# Patient Record
Sex: FEMALE | Race: WHITE | NOT HISPANIC OR LATINO
[De-identification: names, ages, dates, MRNs, and addresses within clinical notes are randomized per-mention and may not be internally consistent; named-entity substitution may affect disease eponyms.]

---

## 2017-06-12 ENCOUNTER — OTHER (OUTPATIENT)
Age: 82
End: 2017-06-12

## 2017-06-23 LAB
CHOLEST SERPL-MCNC: 137
GLUCOSE SERPL-MCNC: 78
HBA1C MFR BLD HPLC: 5.5
HDLC SERPL-MCNC: 58
LDLC SERPL CALC-MCNC: 62

## 2017-06-26 ENCOUNTER — APPOINTMENT (OUTPATIENT)
Dept: INTERNAL MEDICINE | Facility: CLINIC | Age: 82
End: 2017-06-26

## 2017-06-26 ENCOUNTER — NON-APPOINTMENT (OUTPATIENT)
Age: 82
End: 2017-06-26

## 2017-06-26 VITALS
SYSTOLIC BLOOD PRESSURE: 104 MMHG | BODY MASS INDEX: 21.34 KG/M2 | DIASTOLIC BLOOD PRESSURE: 68 MMHG | TEMPERATURE: 98.3 F | HEART RATE: 66 BPM | HEIGHT: 62 IN | RESPIRATION RATE: 18 BRPM | OXYGEN SATURATION: 96 % | WEIGHT: 115.99 LBS

## 2017-08-11 ENCOUNTER — RX RENEWAL (OUTPATIENT)
Age: 82
End: 2017-08-11

## 2017-08-28 ENCOUNTER — EMERGENCY (EMERGENCY)
Facility: HOSPITAL | Age: 82
LOS: 1 days | Discharge: HOME | End: 2017-08-28
Attending: EMERGENCY MEDICINE
Payer: MEDICARE

## 2017-08-28 VITALS
HEIGHT: 62 IN | SYSTOLIC BLOOD PRESSURE: 154 MMHG | WEIGHT: 115.08 LBS | HEART RATE: 73 BPM | TEMPERATURE: 98 F | DIASTOLIC BLOOD PRESSURE: 74 MMHG | RESPIRATION RATE: 18 BRPM | OXYGEN SATURATION: 97 %

## 2017-08-28 VITALS
RESPIRATION RATE: 18 BRPM | SYSTOLIC BLOOD PRESSURE: 145 MMHG | OXYGEN SATURATION: 98 % | DIASTOLIC BLOOD PRESSURE: 68 MMHG | HEART RATE: 75 BPM

## 2017-08-28 LAB
ALBUMIN SERPL ELPH-MCNC: 4.2 G/DL — SIGNIFICANT CHANGE UP (ref 3.3–5.2)
ALP SERPL-CCNC: 57 U/L — SIGNIFICANT CHANGE UP (ref 40–120)
ALT FLD-CCNC: 16 U/L — SIGNIFICANT CHANGE UP
ANION GAP SERPL CALC-SCNC: 15 MMOL/L — SIGNIFICANT CHANGE UP (ref 5–17)
AST SERPL-CCNC: 24 U/L — SIGNIFICANT CHANGE UP
BASOPHILS # BLD AUTO: 0 K/UL — SIGNIFICANT CHANGE UP (ref 0–0.2)
BASOPHILS NFR BLD AUTO: 0.2 % — SIGNIFICANT CHANGE UP (ref 0–2)
BILIRUB SERPL-MCNC: 0.4 MG/DL — SIGNIFICANT CHANGE UP (ref 0.4–2)
BUN SERPL-MCNC: 20 MG/DL — SIGNIFICANT CHANGE UP (ref 8–20)
CALCIUM SERPL-MCNC: 9.6 MG/DL — SIGNIFICANT CHANGE UP (ref 8.6–10.2)
CHLORIDE SERPL-SCNC: 101 MMOL/L — SIGNIFICANT CHANGE UP (ref 98–107)
CK SERPL-CCNC: 43 U/L — SIGNIFICANT CHANGE UP (ref 25–170)
CO2 SERPL-SCNC: 24 MMOL/L — SIGNIFICANT CHANGE UP (ref 22–29)
CREAT SERPL-MCNC: 0.64 MG/DL — SIGNIFICANT CHANGE UP (ref 0.5–1.3)
EOSINOPHIL # BLD AUTO: 0 K/UL — SIGNIFICANT CHANGE UP (ref 0–0.5)
EOSINOPHIL NFR BLD AUTO: 0.5 % — SIGNIFICANT CHANGE UP (ref 0–6)
GLUCOSE SERPL-MCNC: 88 MG/DL — SIGNIFICANT CHANGE UP (ref 70–115)
HCT VFR BLD CALC: 38.2 % — SIGNIFICANT CHANGE UP (ref 37–47)
HGB BLD-MCNC: 12.9 G/DL — SIGNIFICANT CHANGE UP (ref 12–16)
LIDOCAIN IGE QN: 26 U/L — SIGNIFICANT CHANGE UP (ref 22–51)
LYMPHOCYTES # BLD AUTO: 1.4 K/UL — SIGNIFICANT CHANGE UP (ref 1–4.8)
LYMPHOCYTES # BLD AUTO: 15.9 % — LOW (ref 20–55)
MCHC RBC-ENTMCNC: 31.6 PG — HIGH (ref 27–31)
MCHC RBC-ENTMCNC: 33.8 G/DL — SIGNIFICANT CHANGE UP (ref 32–36)
MCV RBC AUTO: 93.6 FL — SIGNIFICANT CHANGE UP (ref 81–99)
MONOCYTES # BLD AUTO: 0.6 K/UL — SIGNIFICANT CHANGE UP (ref 0–0.8)
MONOCYTES NFR BLD AUTO: 6.6 % — SIGNIFICANT CHANGE UP (ref 3–10)
NEUTROPHILS # BLD AUTO: 6.5 K/UL — SIGNIFICANT CHANGE UP (ref 1.8–8)
NEUTROPHILS NFR BLD AUTO: 76.4 % — HIGH (ref 37–73)
PLATELET # BLD AUTO: 188 K/UL — SIGNIFICANT CHANGE UP (ref 150–400)
POTASSIUM SERPL-MCNC: 4.4 MMOL/L — SIGNIFICANT CHANGE UP (ref 3.5–5.3)
POTASSIUM SERPL-SCNC: 4.4 MMOL/L — SIGNIFICANT CHANGE UP (ref 3.5–5.3)
PROT SERPL-MCNC: 7 G/DL — SIGNIFICANT CHANGE UP (ref 6.6–8.7)
RBC # BLD: 4.08 M/UL — LOW (ref 4.4–5.2)
RBC # FLD: 13.6 % — SIGNIFICANT CHANGE UP (ref 11–15.6)
SODIUM SERPL-SCNC: 140 MMOL/L — SIGNIFICANT CHANGE UP (ref 135–145)
TROPONIN T SERPL-MCNC: <0.01 NG/ML — SIGNIFICANT CHANGE UP (ref 0–0.06)
TROPONIN T SERPL-MCNC: <0.01 NG/ML — SIGNIFICANT CHANGE UP (ref 0–0.06)
WBC # BLD: 8.5 K/UL — SIGNIFICANT CHANGE UP (ref 4.8–10.8)
WBC # FLD AUTO: 8.5 K/UL — SIGNIFICANT CHANGE UP (ref 4.8–10.8)

## 2017-08-28 PROCEDURE — 80053 COMPREHEN METABOLIC PANEL: CPT

## 2017-08-28 PROCEDURE — 82550 ASSAY OF CK (CPK): CPT

## 2017-08-28 PROCEDURE — 85027 COMPLETE CBC AUTOMATED: CPT

## 2017-08-28 PROCEDURE — 99285 EMERGENCY DEPT VISIT HI MDM: CPT

## 2017-08-28 PROCEDURE — 99283 EMERGENCY DEPT VISIT LOW MDM: CPT

## 2017-08-28 PROCEDURE — 84484 ASSAY OF TROPONIN QUANT: CPT

## 2017-08-28 PROCEDURE — 36415 COLL VENOUS BLD VENIPUNCTURE: CPT

## 2017-08-28 PROCEDURE — 93010 ELECTROCARDIOGRAM REPORT: CPT

## 2017-08-28 PROCEDURE — 83690 ASSAY OF LIPASE: CPT

## 2017-08-28 PROCEDURE — 93005 ELECTROCARDIOGRAM TRACING: CPT

## 2017-08-28 RX ORDER — ONDANSETRON 8 MG/1
4 TABLET, FILM COATED ORAL ONCE
Qty: 0 | Refills: 0 | Status: COMPLETED | OUTPATIENT
Start: 2017-08-28 | End: 2017-08-28

## 2017-08-28 NOTE — ED PROVIDER NOTE - OBJECTIVE STATEMENT
This patient is an 85 year old woman with hx of HTN and HLD who presents to the ER c/o nausea and "not feeling well."  She denies chest pain, SOB, fever, abdominal pain.  No episodes of vomiting.

## 2017-08-28 NOTE — ED ADULT NURSE NOTE - OBJECTIVE STATEMENT
85 year old female a&ox3 comes to ED c/o of not feeling right this morning. pt. states she was feeling weak and lightheaded. pt. denies chest pain or sob, pt. states she feels pretty good right now. pt. in no apparent distress at this time.

## 2017-08-28 NOTE — ED ADULT TRIAGE NOTE - CHIEF COMPLAINT QUOTE
[facility] from the [facility] c/o upset stomach for an hour and a half and weakness, Sister from the [facility] c/o upset stomach for an hour and a half and weakness, as per MD Johnston with clinical presentation patient can go to strait to [facility].

## 2017-08-28 NOTE — ED ADULT NURSE REASSESSMENT NOTE - NS ED NURSE REASSESS COMMENT FT1
Pt. resting comfortably on stretcher in no apparent distress.  Denies pain.  Denies weakness, lightheadedness.  Family at bedside.  Awaiting lab order/draw.  POC explained.  In no apparent distress, will continue to monitor.

## 2017-08-28 NOTE — ED PROVIDER NOTE - MEDICAL DECISION MAKING DETAILS
Nausea without chest pain, vomiting and abdominal pain.  Patient feeling unwell.  Will check EKG, Labs, and Re-eval

## 2017-08-28 NOTE — ED ADULT NURSE NOTE - CHIEF COMPLAINT QUOTE
[facility] from the [facility] c/o upset stomach for an hour and a half and weakness, as per MD Johnston with clinical presentation patient can go to strait to [facility].

## 2017-09-19 NOTE — ED PROVIDER NOTE - INTERPRETATION
normal sinus rhythm Coronary artery disease involving native coronary artery of native heart without angina pectoris

## 2017-10-27 ENCOUNTER — INPATIENT (INPATIENT)
Facility: HOSPITAL | Age: 82
LOS: 2 days | Discharge: HOME | DRG: 313 | End: 2017-10-30
Attending: INTERNAL MEDICINE | Admitting: HOSPITALIST
Payer: MEDICARE

## 2017-10-27 VITALS
HEIGHT: 62 IN | DIASTOLIC BLOOD PRESSURE: 71 MMHG | HEART RATE: 76 BPM | SYSTOLIC BLOOD PRESSURE: 160 MMHG | TEMPERATURE: 98 F | OXYGEN SATURATION: 95 % | RESPIRATION RATE: 20 BRPM | WEIGHT: 115.08 LBS

## 2017-10-27 DIAGNOSIS — R07.9 CHEST PAIN, UNSPECIFIED: ICD-10-CM

## 2017-10-27 LAB
ALBUMIN SERPL ELPH-MCNC: 4.5 G/DL — SIGNIFICANT CHANGE UP (ref 3.3–5.2)
ALP SERPL-CCNC: 67 U/L — SIGNIFICANT CHANGE UP (ref 40–120)
ALT FLD-CCNC: 19 U/L — SIGNIFICANT CHANGE UP
ANION GAP SERPL CALC-SCNC: 15 MMOL/L — SIGNIFICANT CHANGE UP (ref 5–17)
AST SERPL-CCNC: 27 U/L — SIGNIFICANT CHANGE UP
BASOPHILS # BLD AUTO: 0 K/UL — SIGNIFICANT CHANGE UP (ref 0–0.2)
BASOPHILS NFR BLD AUTO: 0.2 % — SIGNIFICANT CHANGE UP (ref 0–2)
BILIRUB SERPL-MCNC: 0.4 MG/DL — SIGNIFICANT CHANGE UP (ref 0.4–2)
BUN SERPL-MCNC: 18 MG/DL — SIGNIFICANT CHANGE UP (ref 8–20)
CALCIUM SERPL-MCNC: 10.2 MG/DL — SIGNIFICANT CHANGE UP (ref 8.6–10.2)
CHLORIDE SERPL-SCNC: 100 MMOL/L — SIGNIFICANT CHANGE UP (ref 98–107)
CK SERPL-CCNC: 42 U/L — SIGNIFICANT CHANGE UP (ref 25–170)
CO2 SERPL-SCNC: 24 MMOL/L — SIGNIFICANT CHANGE UP (ref 22–29)
CREAT SERPL-MCNC: 0.64 MG/DL — SIGNIFICANT CHANGE UP (ref 0.5–1.3)
EOSINOPHIL # BLD AUTO: 0.2 K/UL — SIGNIFICANT CHANGE UP (ref 0–0.5)
EOSINOPHIL NFR BLD AUTO: 1.8 % — SIGNIFICANT CHANGE UP (ref 0–6)
GLUCOSE SERPL-MCNC: 83 MG/DL — SIGNIFICANT CHANGE UP (ref 70–115)
HCT VFR BLD CALC: 39 % — SIGNIFICANT CHANGE UP (ref 37–47)
HGB BLD-MCNC: 13.2 G/DL — SIGNIFICANT CHANGE UP (ref 12–16)
LIDOCAIN IGE QN: 30 U/L — SIGNIFICANT CHANGE UP (ref 22–51)
LYMPHOCYTES # BLD AUTO: 1.7 K/UL — SIGNIFICANT CHANGE UP (ref 1–4.8)
LYMPHOCYTES # BLD AUTO: 19 % — LOW (ref 20–55)
MCHC RBC-ENTMCNC: 31.4 PG — HIGH (ref 27–31)
MCHC RBC-ENTMCNC: 33.8 G/DL — SIGNIFICANT CHANGE UP (ref 32–36)
MCV RBC AUTO: 92.6 FL — SIGNIFICANT CHANGE UP (ref 81–99)
MONOCYTES # BLD AUTO: 0.8 K/UL — SIGNIFICANT CHANGE UP (ref 0–0.8)
MONOCYTES NFR BLD AUTO: 8.7 % — SIGNIFICANT CHANGE UP (ref 3–10)
NEUTROPHILS # BLD AUTO: 6.3 K/UL — SIGNIFICANT CHANGE UP (ref 1.8–8)
NEUTROPHILS NFR BLD AUTO: 69.9 % — SIGNIFICANT CHANGE UP (ref 37–73)
PLATELET # BLD AUTO: 231 K/UL — SIGNIFICANT CHANGE UP (ref 150–400)
POTASSIUM SERPL-MCNC: 4.5 MMOL/L — SIGNIFICANT CHANGE UP (ref 3.5–5.3)
POTASSIUM SERPL-SCNC: 4.5 MMOL/L — SIGNIFICANT CHANGE UP (ref 3.5–5.3)
PROT SERPL-MCNC: 7.7 G/DL — SIGNIFICANT CHANGE UP (ref 6.6–8.7)
RBC # BLD: 4.21 M/UL — LOW (ref 4.4–5.2)
RBC # FLD: 13.7 % — SIGNIFICANT CHANGE UP (ref 11–15.6)
SODIUM SERPL-SCNC: 139 MMOL/L — SIGNIFICANT CHANGE UP (ref 135–145)
TROPONIN T SERPL-MCNC: <0.01 NG/ML — SIGNIFICANT CHANGE UP (ref 0–0.06)
WBC # BLD: 9 K/UL — SIGNIFICANT CHANGE UP (ref 4.8–10.8)
WBC # FLD AUTO: 9 K/UL — SIGNIFICANT CHANGE UP (ref 4.8–10.8)

## 2017-10-27 PROCEDURE — 93010 ELECTROCARDIOGRAM REPORT: CPT

## 2017-10-27 PROCEDURE — 99285 EMERGENCY DEPT VISIT HI MDM: CPT | Mod: GC

## 2017-10-27 PROCEDURE — 71110 X-RAY EXAM RIBS BIL 3 VIEWS: CPT | Mod: 26

## 2017-10-27 PROCEDURE — 71010: CPT | Mod: 26,59

## 2017-10-27 PROCEDURE — 99223 1ST HOSP IP/OBS HIGH 75: CPT

## 2017-10-27 RX ORDER — ENOXAPARIN SODIUM 100 MG/ML
40 INJECTION SUBCUTANEOUS DAILY
Qty: 0 | Refills: 0 | Status: DISCONTINUED | OUTPATIENT
Start: 2017-10-27 | End: 2017-10-30

## 2017-10-27 RX ORDER — LEVOTHYROXINE SODIUM 125 MCG
50 TABLET ORAL DAILY
Qty: 0 | Refills: 0 | Status: DISCONTINUED | OUTPATIENT
Start: 2017-10-27 | End: 2017-10-30

## 2017-10-27 RX ORDER — SODIUM CHLORIDE 9 MG/ML
3 INJECTION INTRAMUSCULAR; INTRAVENOUS; SUBCUTANEOUS ONCE
Qty: 0 | Refills: 0 | Status: COMPLETED | OUTPATIENT
Start: 2017-10-27 | End: 2017-10-27

## 2017-10-27 RX ORDER — SIMVASTATIN 20 MG/1
20 TABLET, FILM COATED ORAL AT BEDTIME
Qty: 0 | Refills: 0 | Status: DISCONTINUED | OUTPATIENT
Start: 2017-10-27 | End: 2017-10-30

## 2017-10-27 RX ORDER — ASPIRIN/CALCIUM CARB/MAGNESIUM 324 MG
81 TABLET ORAL DAILY
Qty: 0 | Refills: 0 | Status: DISCONTINUED | OUTPATIENT
Start: 2017-10-28 | End: 2017-10-30

## 2017-10-27 RX ORDER — ASPIRIN/CALCIUM CARB/MAGNESIUM 324 MG
325 TABLET ORAL ONCE
Qty: 0 | Refills: 0 | Status: COMPLETED | OUTPATIENT
Start: 2017-10-27 | End: 2017-10-27

## 2017-10-27 RX ORDER — PANTOPRAZOLE SODIUM 20 MG/1
40 TABLET, DELAYED RELEASE ORAL
Qty: 0 | Refills: 0 | Status: DISCONTINUED | OUTPATIENT
Start: 2017-10-27 | End: 2017-10-30

## 2017-10-27 RX ORDER — ALPRAZOLAM 0.25 MG
0.25 TABLET ORAL ONCE
Qty: 0 | Refills: 0 | Status: DISCONTINUED | OUTPATIENT
Start: 2017-10-27 | End: 2017-10-27

## 2017-10-27 RX ORDER — LOSARTAN POTASSIUM 100 MG/1
50 TABLET, FILM COATED ORAL DAILY
Qty: 0 | Refills: 0 | Status: DISCONTINUED | OUTPATIENT
Start: 2017-10-27 | End: 2017-10-30

## 2017-10-27 RX ORDER — NITROGLYCERIN 6.5 MG
1 CAPSULE, EXTENDED RELEASE ORAL ONCE
Qty: 0 | Refills: 0 | Status: COMPLETED | OUTPATIENT
Start: 2017-10-27 | End: 2017-10-27

## 2017-10-27 RX ADMIN — Medication 0.25 MILLIGRAM(S): at 22:58

## 2017-10-27 RX ADMIN — SODIUM CHLORIDE 3 MILLILITER(S): 9 INJECTION INTRAMUSCULAR; INTRAVENOUS; SUBCUTANEOUS at 22:45

## 2017-10-27 RX ADMIN — SODIUM CHLORIDE 3 MILLILITER(S): 9 INJECTION INTRAMUSCULAR; INTRAVENOUS; SUBCUTANEOUS at 19:13

## 2017-10-27 RX ADMIN — Medication 325 MILLIGRAM(S): at 19:13

## 2017-10-27 NOTE — ED PROVIDER NOTE - ATTENDING CONTRIBUTION TO CARE
I, Harlan Stringer, performed the initial face to face bedside interview with this patient regarding history of present illness, review of symptoms and relevant past medical, social and family history.  I completed an independent physical examination.  I was the initial provider who evaluated this patient. I have signed out the follow up of any pending tests (i.e. labs, radiological studies) to the resident.  I have communicated the patient’s plan of care and disposition with the resident.

## 2017-10-27 NOTE — H&P ADULT - NSHPLABSRESULTS_GEN_ALL_CORE
LABS:                        13.2   9.0   )-----------( 231      ( 27 Oct 2017 19:53 )             39.0     10-27    139  |  100  |  18.0  ----------------------------<  83  4.5   |  24.0  |  0.64    Ca    10.2      27 Oct 2017 19:53    TPro  7.7  /  Alb  4.5  /  TBili  0.4  /  DBili  x   /  AST  27  /  ALT  19  /  AlkPhos  67  10-27      CARDIAC MARKERS ( 27 Oct 2017 19:53 )  x     / <0.01 ng/mL / 42 U/L / x     / x        EKG: NSR at bpm. Normal axis. No acute ST changes.

## 2017-10-27 NOTE — H&P ADULT - ASSESSMENT
86 years old female with PMH of HTN, HLD, Hypothyroidism and Seizure Disorder (s/p craniotomy) comes with epigastric discomfort. As pe patient, she woke up this morning - had breakfast and then went for food shopping. Around 10 am, she started having epigastric / retrosternal discomfort - describes as heaviness associated with nausea. Denies vomiting, palpitations, shortness of breath, headache, dizziness or fever. Denies eating anything different.    She has this discomfort in past and it resolves on it's own. She was seen here in August and was discharged after being evaluated in ER.     1) R/O ACS  - Cardiac Enzymes, Lipid Profile and HbA1c  - ECHO  - Continue Aspirin and Simvastatin  - Will consider Cardiology Consult after ECHO  2) HTN  - Continue Losartan  3) HLD  - Lipid Profile  - Continue Simvastatin   4) Hypothyroidism  - Continue Levothyroxine  DVT Prophylaxis -- Lovenox 40 mg 86 years old female with PMH of HTN, HLD, Hypothyroidism and Seizure Disorder (s/p craniotomy) comes with epigastric discomfort. As per patient, she woke up this morning - had breakfast and then went for food shopping. Around 10 am, she started having epigastric / retrosternal discomfort - describes as heaviness associated with nausea. Denies vomiting, palpitations, shortness of breath, headache, dizziness or fever. Denies eating anything different.    She had this discomfort in past and it resolves on it's own. She was seen here in August with same complain and was discharged after being evaluated in ER.     1) R/O ACS  - Cardiac Enzymes, Lipid Profile and HbA1c  - ECHO  - Continue Aspirin and Simvastatin  - Will consider Cardiology Consult after ECHO  2) HTN  - Continue Losartan  3) HLD  - Lipid Profile  - Continue Simvastatin   4) Hypothyroidism  - Continue Levothyroxine  DVT Prophylaxis -- Lovenox 40 mg

## 2017-10-27 NOTE — ED PROVIDER NOTE - CARE PLAN
Principal Discharge DX:	Chest pain with high risk of acute coronary syndrome  Secondary Diagnosis:	HLD (hyperlipidemia)  Secondary Diagnosis:	HTN (hypertension)

## 2017-10-27 NOTE — ED ADULT NURSE REASSESSMENT NOTE - NS ED NURSE REASSESS COMMENT FT1
Pt is Aox3 in NAD. Pt denies complaint.  IV clean dry and intact, Pt OOB independently. Safety maintained, Bed locked in lowest position. Call bell in reach. Pt awaiting bed placement.

## 2017-10-27 NOTE — H&P ADULT - HISTORY OF PRESENT ILLNESS
86 years old female with PMH of HTN, HLD, Hypothyroidism and Seizure Disorder (s/p craniotomy) comes with epigastric discomfort. As pe patient, she woke up this morning - had breakfast and then went for food shopping. Around 10 am, she started having epigastric / retrosternal discomfort - describes as heaviness associated with nausea. Denies vomiting, palpitations, shortness of breath, headache, dizziness or fever. Denies eating anything different.    She has this discomfort in past and it resolves on it's own. She was seen here in August and was discharged after being evaluated in ER. 86 years old female with PMH of HTN, HLD, Hypothyroidism and Seizure Disorder (s/p craniotomy) comes with epigastric discomfort. As per patient, she woke up this morning - had breakfast and then went for food shopping. Around 10 am, she started having epigastric / retrosternal discomfort - describes as heaviness associated with nausea. Denies vomiting, palpitations, shortness of breath, headache, dizziness or fever. Denies eating anything different.    She had this discomfort in past and it resolves on it's own. She was seen here in August with same complain and was discharged after being evaluated in ER.

## 2017-10-27 NOTE — H&P ADULT - FAMILY HISTORY
Father  Still living? Unknown  Family history of heart disease, Age at diagnosis: Age Unknown     Sibling  Still living? Unknown  Family history of heart disease, Age at diagnosis: Age Unknown

## 2017-10-27 NOTE — H&P ADULT - PSH
H/O craniotomy  due to seizures. Had resection of one of the cranial nerve (? 8th) on right side as per patient.  S/P appendectomy

## 2017-10-27 NOTE — ED PROVIDER NOTE - OBJECTIVE STATEMENT
85 yo female pmh HTN, HLD, hypothyroid, presents with epigastric "pressure" beginning at 10 am, with associated nausea. Pressure is non-radiating, without provoking factors, and has decreased since this morning. No vomiting, diarrhea, constipation, chest pain, SOB, headache, or lightheadedness.

## 2017-10-27 NOTE — H&P ADULT - NSHPPHYSICALEXAM_GEN_ALL_CORE
Vital Signs   T(C): 36.9 (27 Oct 2017 22:30), Max: 36.9 (27 Oct 2017 22:30)  T(F): 98.4 (27 Oct 2017 22:30), Max: 98.4 (27 Oct 2017 22:30)  HR: 74 (27 Oct 2017 22:30) (74 - 76)  BP: 144/65 (27 Oct 2017 22:30) (144/65 - 160/71)  RR: 18 (27 Oct 2017 22:30) (18 - 20)  SpO2: 96% (27 Oct 2017 22:30) (95% - 96%)  General: Well developed. Well nourished. No acute distress  HEENT: PERRLA. EOMI. Clear conjunctivae. Moist mucus membrane  Neck: Supple. No JVD. No Thyromegaly   Chest: CTA bilaterally - no wheezing, rales or rhonchi. No chest wall tenderness.  Heart: Normal S1 & S2 with RRR. No murmur.   Abdomen: Soft. Non-tender. Non-distended. + BS  Ext: No pedal edema. No calf tenderness   Neuro: AAO x 3, No focal deficit, CN II-XII grossly WNL and no speech disorder  Skin: Warm and Dry  Psychiatry: Normal mood and affect

## 2017-10-27 NOTE — ED ADULT NURSE NOTE - OBJECTIVE STATEMENT
Pt reports epigastric/lower chest discomfort with nausea in the am shortly after eating oatmeal, orange juice and coffee.

## 2017-10-28 DIAGNOSIS — Z90.49 ACQUIRED ABSENCE OF OTHER SPECIFIED PARTS OF DIGESTIVE TRACT: Chronic | ICD-10-CM

## 2017-10-28 DIAGNOSIS — Z98.890 OTHER SPECIFIED POSTPROCEDURAL STATES: Chronic | ICD-10-CM

## 2017-10-28 LAB
ANION GAP SERPL CALC-SCNC: 12 MMOL/L — SIGNIFICANT CHANGE UP (ref 5–17)
BASOPHILS # BLD AUTO: 0 K/UL — SIGNIFICANT CHANGE UP (ref 0–0.2)
BASOPHILS NFR BLD AUTO: 0.4 % — SIGNIFICANT CHANGE UP (ref 0–2)
BUN SERPL-MCNC: 16 MG/DL — SIGNIFICANT CHANGE UP (ref 8–20)
CALCIUM SERPL-MCNC: 9.4 MG/DL — SIGNIFICANT CHANGE UP (ref 8.6–10.2)
CHLORIDE SERPL-SCNC: 100 MMOL/L — SIGNIFICANT CHANGE UP (ref 98–107)
CHOLEST SERPL-MCNC: 134 MG/DL — SIGNIFICANT CHANGE UP (ref 110–199)
CK SERPL-CCNC: 31 U/L — SIGNIFICANT CHANGE UP (ref 25–170)
CO2 SERPL-SCNC: 27 MMOL/L — SIGNIFICANT CHANGE UP (ref 22–29)
CREAT SERPL-MCNC: 0.62 MG/DL — SIGNIFICANT CHANGE UP (ref 0.5–1.3)
EOSINOPHIL # BLD AUTO: 0.2 K/UL — SIGNIFICANT CHANGE UP (ref 0–0.5)
EOSINOPHIL NFR BLD AUTO: 3.4 % — SIGNIFICANT CHANGE UP (ref 0–6)
GLUCOSE SERPL-MCNC: 75 MG/DL — SIGNIFICANT CHANGE UP (ref 70–115)
HBA1C BLD-MCNC: 5.4 % — SIGNIFICANT CHANGE UP (ref 4–5.6)
HCT VFR BLD CALC: 36.9 % — LOW (ref 37–47)
HDLC SERPL-MCNC: 65 MG/DL — SIGNIFICANT CHANGE UP
HGB BLD-MCNC: 12.5 G/DL — SIGNIFICANT CHANGE UP (ref 12–16)
LIPID PNL WITH DIRECT LDL SERPL: 52 MG/DL — SIGNIFICANT CHANGE UP
LYMPHOCYTES # BLD AUTO: 2.4 K/UL — SIGNIFICANT CHANGE UP (ref 1–4.8)
LYMPHOCYTES # BLD AUTO: 34.4 % — SIGNIFICANT CHANGE UP (ref 20–55)
MAGNESIUM SERPL-MCNC: 2 MG/DL — SIGNIFICANT CHANGE UP (ref 1.8–2.6)
MCHC RBC-ENTMCNC: 31.8 PG — HIGH (ref 27–31)
MCHC RBC-ENTMCNC: 33.9 G/DL — SIGNIFICANT CHANGE UP (ref 32–36)
MCV RBC AUTO: 93.9 FL — SIGNIFICANT CHANGE UP (ref 81–99)
MONOCYTES # BLD AUTO: 0.8 K/UL — SIGNIFICANT CHANGE UP (ref 0–0.8)
MONOCYTES NFR BLD AUTO: 11.2 % — HIGH (ref 3–10)
NEUTROPHILS # BLD AUTO: 3.4 K/UL — SIGNIFICANT CHANGE UP (ref 1.8–8)
NEUTROPHILS NFR BLD AUTO: 50.3 % — SIGNIFICANT CHANGE UP (ref 37–73)
PHOSPHATE SERPL-MCNC: 3.5 MG/DL — SIGNIFICANT CHANGE UP (ref 2.4–4.7)
PLATELET # BLD AUTO: 209 K/UL — SIGNIFICANT CHANGE UP (ref 150–400)
POTASSIUM SERPL-MCNC: 4.1 MMOL/L — SIGNIFICANT CHANGE UP (ref 3.5–5.3)
POTASSIUM SERPL-SCNC: 4.1 MMOL/L — SIGNIFICANT CHANGE UP (ref 3.5–5.3)
RBC # BLD: 3.93 M/UL — LOW (ref 4.4–5.2)
RBC # FLD: 13.7 % — SIGNIFICANT CHANGE UP (ref 11–15.6)
SODIUM SERPL-SCNC: 139 MMOL/L — SIGNIFICANT CHANGE UP (ref 135–145)
TOTAL CHOLESTEROL/HDL RATIO MEASUREMENT: 2 RATIO — LOW (ref 3.3–7.1)
TRIGL SERPL-MCNC: 85 MG/DL — SIGNIFICANT CHANGE UP (ref 10–200)
TROPONIN T SERPL-MCNC: <0.01 NG/ML — SIGNIFICANT CHANGE UP (ref 0–0.06)
WBC # BLD: 6.9 K/UL — SIGNIFICANT CHANGE UP (ref 4.8–10.8)
WBC # FLD AUTO: 6.9 K/UL — SIGNIFICANT CHANGE UP (ref 4.8–10.8)

## 2017-10-28 PROCEDURE — 99223 1ST HOSP IP/OBS HIGH 75: CPT

## 2017-10-28 PROCEDURE — 99233 SBSQ HOSP IP/OBS HIGH 50: CPT

## 2017-10-28 RX ORDER — UBIDECARENONE 100 MG
1 CAPSULE ORAL
Qty: 0 | Refills: 0 | COMMUNITY

## 2017-10-28 RX ADMIN — PANTOPRAZOLE SODIUM 40 MILLIGRAM(S): 20 TABLET, DELAYED RELEASE ORAL at 05:26

## 2017-10-28 RX ADMIN — SIMVASTATIN 20 MILLIGRAM(S): 20 TABLET, FILM COATED ORAL at 21:12

## 2017-10-28 RX ADMIN — Medication 50 MICROGRAM(S): at 05:26

## 2017-10-28 NOTE — PROGRESS NOTE ADULT - SUBJECTIVE AND OBJECTIVE BOX
MABEL EPILONE  ----------------------------------------  The patient was seen and evaluated for chest pain.  The patient is in no acute distress.  Denied any chest pain, palpitations, dyspnea, or abdominal pain.    Vital Signs Last 24 Hrs  T(C): 36.9 (28 Oct 2017 10:44), Max: 36.9 (27 Oct 2017 22:30)  T(F): 98.5 (28 Oct 2017 10:44), Max: 98.5 (28 Oct 2017 10:44)  HR: 56 (28 Oct 2017 10:44) (54 - 76)  BP: 98/57 (28 Oct 2017 10:44) (98/57 - 160/71)  BP(mean): --  RR: 20 (28 Oct 2017 10:44) (18 - 20)  SpO2: 95% (28 Oct 2017 10:44) (95% - 97%)    PHYSICAL EXAMINATION:  ----------------------------------------  General appearance: NAD, Awake, Alert  HEENT: NCAT, Conjunctiva clear, EOMI  Neck: Supple, No JVD, No tenderness  Lungs: Clear to auscultation, Breath sound equal bilaterally, No wheezes, No rales  Cardiovascular: S1S2, Regular rhythm  Abdomen: Soft, Nontender, Nondistended, No guarding/rebound, Positive bowel sounds  Extremities: No clubbing, No cyanosis, No edema, No calf tenderness  Neuro: Strength equal bilaterally, No tremors  Psychiatric: Appropriate mood, Normal affect    LABORATORY STUDIES:  ----------------------------------------             12.5   6.9   )-----------( 209      ( 28 Oct 2017 06:38 )             36.9     10-28    139  |  100  |  16.0  ----------------------------<  75  4.1   |  27.0  |  0.62    Ca    9.4      28 Oct 2017 06:38  Phos  3.5     10-28  Mg     2.0     10-28    TPro  7.7  /  Alb  4.5  /  TBili  0.4  /  DBili  x   /  AST  27  /  ALT  19  /  AlkPhos  67  10-27    LIVER FUNCTIONS - ( 27 Oct 2017 19:53 )  Alb: 4.5 g/dL / Pro: 7.7 g/dL / ALK PHOS: 67 U/L / ALT: 19 U/L / AST: 27 U/L / GGT: x           CARDIAC MARKERS ( 28 Oct 2017 06:38 )  x     / <0.01 ng/mL / 31 U/L / x     / x      CARDIAC MARKERS ( 27 Oct 2017 19:53 )  x     / <0.01 ng/mL / 42 U/L / x     / x        MEDICATIONS  (STANDING):  aspirin  chewable 81 milliGRAM(s) Oral daily  calcium carbonate 1250 mG + Vitamin D (OsCal 500 + D) 1 Tablet(s) Oral daily  enoxaparin Injectable 40 milliGRAM(s) SubCutaneous daily  levothyroxine 50 MICROGram(s) Oral daily  losartan 50 milliGRAM(s) Oral daily  pantoprazole    Tablet 40 milliGRAM(s) Oral before breakfast  simvastatin 20 milliGRAM(s) Oral at bedtime      ASSESSMENT / PLAN:  ----------------------------------------  Chest pain - On aspirin and statin therapy. Cardiology consultation noted, planned for stress test.    Hypertension - Close blood pressure monitoring.    Hypothyroidism - On levothyroxine.

## 2017-10-28 NOTE — CONSULT NOTE ADULT - ASSESSMENT
86 years old female with PMH of HTN, HLD, Hypothyroidism and Seizure Disorder presenting with upper epigastric/lower chest pressure lasting several hours, similar to prior episodes. No evidence of active cardiac pathology, but given risk factors and symptoms warrants workup for ischemia.   -Monitor BP, would adjust antihypertensive as needed  -TTE  -nuclear stress on monday  -telemetry

## 2017-10-28 NOTE — CONSULT NOTE ADULT - SUBJECTIVE AND OBJECTIVE BOX
HPI:  86 years old female with PMH of HTN, HLD, Hypothyroidism and Seizure Disorder (s/p craniotomy) presenting with upper epigastric discomfort which began yesterday am. She describes pressure sensation in upper mid epigastrum/lower chest without significant radiation. She notes it began when shopping and lasted several hours, but no clear exacerbation with activity or position. She had associated nausea, but denies SOB, fevers, chills, palpitation, syncope. She has had similar discomfort in the past, with no specific workup.   Of note she reports prior episode of transient amnesia and subsequently had cardiology workup in [place], possibly including stress evaluation but no records available.   In ED ECG without ischemic changes and initial cardiac enzymes unremarkable. She feels better at this time, with no complaints at rest.       PAST MEDICAL & SURGICAL HISTORY:  Hypothyroid  HLD (hyperlipidemia)  HTN (hypertension)  S/P appendectomy  H/O craniotomy: due to seizures. Had resection of one of the cranial nerve (? 8th) on right side as per patient.    MEDICATIONS  (STANDING):  aspirin  chewable 81 milliGRAM(s) Oral daily  calcium carbonate 1250 mG + Vitamin D (OsCal 500 + D) 1 Tablet(s) Oral daily  enoxaparin Injectable 40 milliGRAM(s) SubCutaneous daily  levothyroxine 50 MICROGram(s) Oral daily  losartan 50 milliGRAM(s) Oral daily  pantoprazole    Tablet 40 milliGRAM(s) Oral before breakfast  simvastatin 20 milliGRAM(s) Oral at bedtime    MEDICATIONS  (PRN):      Allergies    sulfa drugs (Rash)    Intolerances        SOCIAL HISTORY: denies tob, etoh, drugs    FAMILY HISTORY:  Family history of heart disease (Father, Sibling)      Vital Signs Last 24 Hrs  T(C): 36.7 (28 Oct 2017 07:37), Max: 36.9 (27 Oct 2017 22:30)  T(F): 98 (28 Oct 2017 07:37), Max: 98.4 (27 Oct 2017 22:30)  HR: 60 (28 Oct 2017 07:37) (54 - 76)  BP: 156/65 (28 Oct 2017 07:37) (101/56 - 160/71)  BP(mean): --  RR: 20 (28 Oct 2017 07:37) (18 - 20)  SpO2: 97% (28 Oct 2017 07:37) (95% - 97%)    PHYSICAL EXAM:  NAD  NC/AT  no JVD  CTA b/l  RRR no mrg  s/nt/nd, nml BS  no LE edema, warm  AAOx3, nonfocal  no apparent rash      LABS:                        12.5   6.9   )-----------( 209      ( 28 Oct 2017 06:38 )             36.9   10-28    139  |  100  |  16.0  ----------------------------<  75  4.1   |  27.0  |  0.62    Ca    9.4      28 Oct 2017 06:38  Phos  3.5     10-28  Mg     2.0     10-28    TPro  7.7  /  Alb  4.5  /  TBili  0.4  /  DBili  x   /  AST  27  /  ALT  19  /  AlkPhos  67  10-27  LIVER FUNCTIONS - ( 27 Oct 2017 19:53 )  Alb: 4.5 g/dL / Pro: 7.7 g/dL / ALK PHOS: 67 U/L / ALT: 19 U/L / AST: 27 U/L / GGT: x           CARDIAC MARKERS ( 28 Oct 2017 06:38 )  x     / <0.01 ng/mL / 31 U/L / x     / x      CARDIAC MARKERS ( 27 Oct 2017 19:53 )  x     / <0.01 ng/mL / 42 U/L / x     / x        RADIOLOGY & ADDITIONAL STUDIES:  ECG sinus rhythm 76 bpm, no ischemic changes, narrow QRS HPI:  86 years old female with PMH of HTN, HLD, Hypothyroidism and Seizure Disorder (s/p craniotomy) presenting with upper epigastric discomfort which began yesterday am. She describes pressure sensation in upper mid epigastrum/lower chest without significant radiation. She notes it began when shopping and lasted several hours, but no clear exacerbation with activity or position. She had associated nausea, but denies SOB, fevers, chills, palpitation, syncope. She has had similar discomfort in the past, with no specific workup.   Of note she reports prior episode of transient amnesia and subsequently had cardiology workup in [place], possibly including stress evaluation but no records available. Also recent right rib fracture.  In ED ECG without ischemic changes and initial cardiac enzymes unremarkable. She feels better at this time, with no complaints at rest.       PAST MEDICAL & SURGICAL HISTORY:  Hypothyroid  HLD (hyperlipidemia)  HTN (hypertension)  S/P appendectomy  H/O craniotomy: due to seizures. Had resection of one of the cranial nerve (? 8th) on right side as per patient.    MEDICATIONS  (STANDING):  aspirin  chewable 81 milliGRAM(s) Oral daily  calcium carbonate 1250 mG + Vitamin D (OsCal 500 + D) 1 Tablet(s) Oral daily  enoxaparin Injectable 40 milliGRAM(s) SubCutaneous daily  levothyroxine 50 MICROGram(s) Oral daily  losartan 50 milliGRAM(s) Oral daily  pantoprazole    Tablet 40 milliGRAM(s) Oral before breakfast  simvastatin 20 milliGRAM(s) Oral at bedtime    MEDICATIONS  (PRN):      Allergies    sulfa drugs (Rash)    Intolerances        SOCIAL HISTORY: denies tob, etoh, drugs    FAMILY HISTORY:  Family history of heart disease (Father, Sibling)      Vital Signs Last 24 Hrs  T(C): 36.7 (28 Oct 2017 07:37), Max: 36.9 (27 Oct 2017 22:30)  T(F): 98 (28 Oct 2017 07:37), Max: 98.4 (27 Oct 2017 22:30)  HR: 60 (28 Oct 2017 07:37) (54 - 76)  BP: 156/65 (28 Oct 2017 07:37) (101/56 - 160/71)  BP(mean): --  RR: 20 (28 Oct 2017 07:37) (18 - 20)  SpO2: 97% (28 Oct 2017 07:37) (95% - 97%)    PHYSICAL EXAM:  NAD  NC/AT  no JVD  CTA b/l  RRR no mrg  s/nt/nd, nml BS  no LE edema, warm  AAOx3, nonfocal  no apparent rash      LABS:                        12.5   6.9   )-----------( 209      ( 28 Oct 2017 06:38 )             36.9   10-28    139  |  100  |  16.0  ----------------------------<  75  4.1   |  27.0  |  0.62    Ca    9.4      28 Oct 2017 06:38  Phos  3.5     10-28  Mg     2.0     10-28    TPro  7.7  /  Alb  4.5  /  TBili  0.4  /  DBili  x   /  AST  27  /  ALT  19  /  AlkPhos  67  10-27  LIVER FUNCTIONS - ( 27 Oct 2017 19:53 )  Alb: 4.5 g/dL / Pro: 7.7 g/dL / ALK PHOS: 67 U/L / ALT: 19 U/L / AST: 27 U/L / GGT: x           CARDIAC MARKERS ( 28 Oct 2017 06:38 )  x     / <0.01 ng/mL / 31 U/L / x     / x      CARDIAC MARKERS ( 27 Oct 2017 19:53 )  x     / <0.01 ng/mL / 42 U/L / x     / x        RADIOLOGY & ADDITIONAL STUDIES:  ECG sinus rhythm 76 bpm, no ischemic changes, narrow QRS

## 2017-10-29 PROCEDURE — 99232 SBSQ HOSP IP/OBS MODERATE 35: CPT

## 2017-10-29 PROCEDURE — 99233 SBSQ HOSP IP/OBS HIGH 50: CPT

## 2017-10-29 RX ADMIN — Medication 81 MILLIGRAM(S): at 13:25

## 2017-10-29 RX ADMIN — PANTOPRAZOLE SODIUM 40 MILLIGRAM(S): 20 TABLET, DELAYED RELEASE ORAL at 05:46

## 2017-10-29 RX ADMIN — Medication 50 MICROGRAM(S): at 05:46

## 2017-10-29 RX ADMIN — SIMVASTATIN 20 MILLIGRAM(S): 20 TABLET, FILM COATED ORAL at 21:44

## 2017-10-29 RX ADMIN — LOSARTAN POTASSIUM 50 MILLIGRAM(S): 100 TABLET, FILM COATED ORAL at 05:46

## 2017-10-29 RX ADMIN — Medication 1 TABLET(S): at 13:24

## 2017-10-29 NOTE — PROGRESS NOTE ADULT - ASSESSMENT
86 years old female with PMH of HTN, HLD, Hypothyroidism and Seizure Disorder presenting with upper epigastric/lower chest pressure lasting several hours, similar to prior episodes. No evidence of active cardiac pathology, but given risk factors and symptoms warrants workup for ischemia. Serial cardiac enzymes are negative, and no further active pain.   -awaiting TTE  -nuclear stress on monday  -cont telemetry  -d/c with outpt cardiac followup if above negative

## 2017-10-29 NOTE — PROGRESS NOTE ADULT - SUBJECTIVE AND OBJECTIVE BOX
CC: Chest pain    INTERVAL HPI/OVERNIGHT EVENTS: Patient seen and examined,       Vital Signs Last 24 Hrs  T(C): 36.6 (29 Oct 2017 00:52), Max: 37.1 (28 Oct 2017 18:25)  T(F): 97.9 (29 Oct 2017 00:52), Max: 98.8 (28 Oct 2017 18:25)  HR: 65 (29 Oct 2017 00:52) (56 - 80)  BP: 114/65 (29 Oct 2017 00:52) (98/57 - 155/70)  BP(mean): --  RR: 18 (29 Oct 2017 00:52) (18 - 20)  SpO2: 95% (29 Oct 2017 00:52) (95% - 100%)    PHYSICAL EXAM:    GENERAL: NAD, AOX3  HEAD:  Atraumatic, Normocephalic  EYES: EOMI, PERRLA, conjunctiva and sclera clear  ENMT: Moist mucous membranes  CHEST/LUNG: Clear to auscultation bilaterally; No rales, rhonchi, wheezing, or rubs  HEART: Regular rate and rhythm; No murmurs, rubs, or gallops  ABDOMEN: Soft, Nontender, Nondistended; Bowel sounds present  EXTREMITIES:  2+ Peripheral Pulses, No clubbing, cyanosis, or edema        MEDICATIONS  (STANDING):  aspirin  chewable 81 milliGRAM(s) Oral daily  calcium carbonate 1250 mG + Vitamin D (OsCal 500 + D) 1 Tablet(s) Oral daily  enoxaparin Injectable 40 milliGRAM(s) SubCutaneous daily  levothyroxine 50 MICROGram(s) Oral daily  losartan 50 milliGRAM(s) Oral daily  pantoprazole    Tablet 40 milliGRAM(s) Oral before breakfast  simvastatin 20 milliGRAM(s) Oral at bedtime    MEDICATIONS  (PRN):      Allergies    sulfa drugs (Rash)    Intolerances          LABS:                          12.5   6.9   )-----------( 209      ( 28 Oct 2017 06:38 )             36.9     10-28    139  |  100  |  16.0  ----------------------------<  75  4.1   |  27.0  |  0.62    Ca    9.4      28 Oct 2017 06:38  Phos  3.5     10-28  Mg     2.0     10-28    TPro  7.7  /  Alb  4.5  /  TBili  0.4  /  DBili  x   /  AST  27  /  ALT  19  /  AlkPhos  67  10-27          RADIOLOGY & ADDITIONAL TESTS: CC: Chest pain    INTERVAL HPI/OVERNIGHT EVENTS: Patient seen and examined, chest pain resolved. Denies shortness of breath or palpitations.       Vital Signs Last 24 Hrs  T(C): 36.6 (29 Oct 2017 00:52), Max: 37.1 (28 Oct 2017 18:25)  T(F): 97.9 (29 Oct 2017 00:52), Max: 98.8 (28 Oct 2017 18:25)  HR: 65 (29 Oct 2017 00:52) (56 - 80)  BP: 114/65 (29 Oct 2017 00:52) (98/57 - 155/70)  BP(mean): --  RR: 18 (29 Oct 2017 00:52) (18 - 20)  SpO2: 95% (29 Oct 2017 00:52) (95% - 100%)    PHYSICAL EXAM:    GENERAL: NAD, AOX3  HEAD:  Atraumatic, Normocephalic  EYES: EOMI, PERRLA, conjunctiva and sclera clear  ENMT: Moist mucous membranes  CHEST/LUNG: Clear to auscultation bilaterally; No rales, rhonchi, wheezing, or rubs  HEART: Regular rate and rhythm; No murmurs, rubs, or gallops  ABDOMEN: Soft, Nontender, Nondistended; Bowel sounds present  EXTREMITIES:  2+ Peripheral Pulses, No clubbing, cyanosis, or edema        MEDICATIONS  (STANDING):  aspirin  chewable 81 milliGRAM(s) Oral daily  calcium carbonate 1250 mG + Vitamin D (OsCal 500 + D) 1 Tablet(s) Oral daily  enoxaparin Injectable 40 milliGRAM(s) SubCutaneous daily  levothyroxine 50 MICROGram(s) Oral daily  losartan 50 milliGRAM(s) Oral daily  pantoprazole    Tablet 40 milliGRAM(s) Oral before breakfast  simvastatin 20 milliGRAM(s) Oral at bedtime    MEDICATIONS  (PRN):      Allergies    sulfa drugs (Rash)    Intolerances          LABS:                          12.5   6.9   )-----------( 209      ( 28 Oct 2017 06:38 )             36.9     10-28    139  |  100  |  16.0  ----------------------------<  75  4.1   |  27.0  |  0.62    Ca    9.4      28 Oct 2017 06:38  Phos  3.5     10-28  Mg     2.0     10-28    TPro  7.7  /  Alb  4.5  /  TBili  0.4  /  DBili  x   /  AST  27  /  ALT  19  /  AlkPhos  67  10-27          RADIOLOGY & ADDITIONAL TESTS:

## 2017-10-29 NOTE — PROGRESS NOTE ADULT - SUBJECTIVE AND OBJECTIVE BOX
Pt feeling well today. no further chest/epigastric pain.   BP well controlled.   Tele: sinus rhythm no events    MEDICATIONS  (STANDING):  aspirin  chewable 81 milliGRAM(s) Oral daily  calcium carbonate 1250 mG + Vitamin D (OsCal 500 + D) 1 Tablet(s) Oral daily  enoxaparin Injectable 40 milliGRAM(s) SubCutaneous daily  levothyroxine 50 MICROGram(s) Oral daily  losartan 50 milliGRAM(s) Oral daily  pantoprazole    Tablet 40 milliGRAM(s) Oral before breakfast  simvastatin 20 milliGRAM(s) Oral at bedtime    MEDICATIONS  (PRN):      Allergies    sulfa drugs (Rash)    Intolerances      PAST MEDICAL & SURGICAL HISTORY:  Hypothyroid  HLD (hyperlipidemia)  HTN (hypertension)  S/P appendectomy  H/O craniotomy: due to seizures. Had resection of one of the cranial nerve (? 8th) on right side as per patient.      Vital Signs Last 24 Hrs  T(C): 36.6 (29 Oct 2017 00:52), Max: 37.1 (28 Oct 2017 18:25)  T(F): 97.9 (29 Oct 2017 00:52), Max: 98.8 (28 Oct 2017 18:25)  HR: 65 (29 Oct 2017 00:52) (65 - 80)  BP: 114/65 (29 Oct 2017 00:52) (114/65 - 155/70)  BP(mean): --  RR: 18 (29 Oct 2017 00:52) (18 - 20)  SpO2: 95% (29 Oct 2017 00:52) (95% - 100%)    Physical Exam:  Constitutional: NAD, AAOx3  Cardiovascular: +S1S2 RRR  Pulmonary: CTA b/l, unlabored  GI: soft NTND +BS  Extremities: no pedal edema, +distal pulses b/l  Neuro: non focal, BARRY x4    LABS:                        12.5   6.9   )-----------( 209      ( 28 Oct 2017 06:38 )             36.9     10-28    139  |  100  |  16.0  ----------------------------<  75  4.1   |  27.0  |  0.62    Ca    9.4      28 Oct 2017 06:38  Phos  3.5     10-28  Mg     2.0     10-28    TPro  7.7  /  Alb  4.5  /  TBili  0.4  /  DBili  x   /  AST  27  /  ALT  19  /  AlkPhos  67  10-27          RADIOLOGY & ADDITIONAL TESTS:

## 2017-10-29 NOTE — PROGRESS NOTE ADULT - ASSESSMENT
The patient is an 86 year old female with a history of hypertension, hyperlipidemia and hypothyroidism who presented to the [facility] with complaints of upper epigastric pain/pressure. No acute EKG changes, serial cardiac enzymes were normal. Chest xray showed a right 7th rib fracture.       Assessment/plan:    1. Chest pain - On aspirin and statin therapy. Cardiology consultation noted, planned for stress test in AM NPO after midnight     2. Hypertension - BP controlled.     3. Hypothyroidism - On levothyroxine.

## 2017-10-30 ENCOUNTER — TRANSCRIPTION ENCOUNTER (OUTPATIENT)
Age: 82
End: 2017-10-30

## 2017-10-30 VITALS
OXYGEN SATURATION: 97 % | DIASTOLIC BLOOD PRESSURE: 62 MMHG | RESPIRATION RATE: 18 BRPM | SYSTOLIC BLOOD PRESSURE: 114 MMHG | HEART RATE: 71 BPM | TEMPERATURE: 98 F

## 2017-10-30 PROCEDURE — 99238 HOSP IP/OBS DSCHRG MGMT 30/<: CPT

## 2017-10-30 PROCEDURE — 71045 X-RAY EXAM CHEST 1 VIEW: CPT

## 2017-10-30 PROCEDURE — 93016 CV STRESS TEST SUPVJ ONLY: CPT

## 2017-10-30 PROCEDURE — A9500: CPT

## 2017-10-30 PROCEDURE — 83036 HEMOGLOBIN GLYCOSYLATED A1C: CPT

## 2017-10-30 PROCEDURE — 84100 ASSAY OF PHOSPHORUS: CPT

## 2017-10-30 PROCEDURE — 83735 ASSAY OF MAGNESIUM: CPT

## 2017-10-30 PROCEDURE — 99285 EMERGENCY DEPT VISIT HI MDM: CPT | Mod: 25

## 2017-10-30 PROCEDURE — 80048 BASIC METABOLIC PNL TOTAL CA: CPT

## 2017-10-30 PROCEDURE — 80053 COMPREHEN METABOLIC PANEL: CPT

## 2017-10-30 PROCEDURE — 84484 ASSAY OF TROPONIN QUANT: CPT

## 2017-10-30 PROCEDURE — 83690 ASSAY OF LIPASE: CPT

## 2017-10-30 PROCEDURE — 93018 CV STRESS TEST I&R ONLY: CPT

## 2017-10-30 PROCEDURE — 85027 COMPLETE CBC AUTOMATED: CPT

## 2017-10-30 PROCEDURE — 80061 LIPID PANEL: CPT

## 2017-10-30 PROCEDURE — 78452 HT MUSCLE IMAGE SPECT MULT: CPT | Mod: 26

## 2017-10-30 PROCEDURE — 93005 ELECTROCARDIOGRAM TRACING: CPT

## 2017-10-30 PROCEDURE — 36415 COLL VENOUS BLD VENIPUNCTURE: CPT

## 2017-10-30 PROCEDURE — 71110 X-RAY EXAM RIBS BIL 3 VIEWS: CPT

## 2017-10-30 PROCEDURE — 93017 CV STRESS TEST TRACING ONLY: CPT

## 2017-10-30 PROCEDURE — 82550 ASSAY OF CK (CPK): CPT

## 2017-10-30 PROCEDURE — 78452 HT MUSCLE IMAGE SPECT MULT: CPT

## 2017-10-30 RX ORDER — PANTOPRAZOLE SODIUM 20 MG/1
1 TABLET, DELAYED RELEASE ORAL
Qty: 30 | Refills: 0 | OUTPATIENT
Start: 2017-10-30 | End: 2017-11-29

## 2017-10-30 RX ADMIN — PANTOPRAZOLE SODIUM 40 MILLIGRAM(S): 20 TABLET, DELAYED RELEASE ORAL at 06:09

## 2017-10-30 RX ADMIN — ENOXAPARIN SODIUM 40 MILLIGRAM(S): 100 INJECTION SUBCUTANEOUS at 12:26

## 2017-10-30 RX ADMIN — Medication 50 MICROGRAM(S): at 06:09

## 2017-10-30 RX ADMIN — Medication 1 TABLET(S): at 12:26

## 2017-10-30 RX ADMIN — Medication 81 MILLIGRAM(S): at 12:26

## 2017-10-30 NOTE — DISCHARGE NOTE ADULT - CARE PLAN
Principal Discharge DX:	Chest pain with high risk of acute coronary syndrome  Goal:	Resolved  Instructions for follow-up, activity and diet:	Continue current medications as prescribed.  Follow up with cardiology in 1 week.  Secondary Diagnosis:	Essential hypertension  Instructions for follow-up, activity and diet:	Continue current medications as prescribed.  Secondary Diagnosis:	Hyperlipidemia, unspecified hyperlipidemia type  Instructions for follow-up, activity and diet:	Continue current medications as prescribed.  Secondary Diagnosis:	Hypothyroidism, unspecified type  Instructions for follow-up, activity and diet:	Continue current medications as prescribed.

## 2017-10-30 NOTE — DISCHARGE NOTE ADULT - MEDICATION SUMMARY - MEDICATIONS TO TAKE
I will START or STAY ON the medications listed below when I get home from the hospital:    aspirin 81 mg oral tablet  -- 1 tab(s) by mouth once a day  -- Take with food or milk.    -- Indication: For Heart protection    losartan 50 mg oral tablet  -- 1 tab(s) by mouth once a day  -- Indication: For HTN (hypertension)    simvastatin 20 mg oral tablet  --  by mouth   -- Indication: For HLD (hyperlipidemia)    Co Q-10 100 mg oral capsule  -- 1 cap(s) by mouth once a day  -- Indication: For HLD (hyperlipidemia)    pantoprazole 40 mg oral delayed release tablet  -- 1 tab(s) by mouth once a day (before a meal)  -- Indication: For Epigastric pain    levothyroxine 50 mcg (0.05 mg) oral capsule  -- 1 cap(s) by mouth once a day  -- Indication: For Hypothyroid    B Complex 50  -- 1 tab(s) by mouth once a day  -- Indication: For Supplement    calcium-vitamin D 500 mg-200 intl units oral tablet  -- 1 tab(s) by mouth once a day  -- Indication: For Supplement

## 2017-10-30 NOTE — DISCHARGE NOTE ADULT - HOSPITAL COURSE
86 years old female with PMH of HTN, HLD, Hypothyroidism and Seizure Disorder (s/p craniotomy) comes with epigastric discomfort. Patient was evaluated by cardiology.  Stress test was done. 86 years old female with PMH of HTN, HLD, Hypothyroidism and Seizure Disorder (s/p craniotomy) comes with epigastric discomfort. Patient was evaluated by cardiology.  Stress test was normal with EF 76%.  Patient stable for discharge to home with outpatient follow up with primary doctor and cardiology.

## 2017-10-30 NOTE — DISCHARGE NOTE ADULT - CARE PROVIDERS DIRECT ADDRESSES
[email or web address]

## 2017-10-30 NOTE — PROGRESS NOTE ADULT - ASSESSMENT
The patient is an 86 year old female with a history of hypertension, hyperlipidemia and hypothyroidism who presented to the [facility] with complaints of upper epigastric pain/pressure. No acute EKG changes, serial cardiac enzymes were normal. Chest xray showed a right 7th rib fracture.       Assessment/plan:    1. Chest pain - On aspirin and statin therapy. Cardiology consultation noted, s/p stress test today. Awaiting report.    2. Hypertension - BP controlled.     3. Hypothyroidism - On levothyroxine.

## 2017-10-30 NOTE — DISCHARGE NOTE ADULT - PATIENT PORTAL LINK FT
“You can access the FollowHealth Patient Portal, offered by [facility], by registering with the following website: [email or web address]”

## 2017-10-30 NOTE — PROGRESS NOTE ADULT - SUBJECTIVE AND OBJECTIVE BOX
CC: Epigastric discomfort    HPI:  86 years old female with PMH of HTN, HLD, Hypothyroidism and Seizure Disorder (s/p craniotomy) comes with epigastric discomfort.     INTERVAL HPI/OVERNIGHT EVENTS:  Patient seen and examined lying in bed.  "I want to go home."  Patient denies any headache, dizziness, SOB, CP, abdominal pain, nausea, vomiting, dysuria.  Other ROS reviewed and are negative.    Vital Signs Last 24 Hrs  T(C): 36.9 (30 Oct 2017 09:14), Max: 36.9 (30 Oct 2017 09:14)  T(F): 98.4 (30 Oct 2017 09:14), Max: 98.4 (30 Oct 2017 09:14)  HR: 76 (30 Oct 2017 09:14) (68 - 76)  BP: 129/61 (30 Oct 2017 09:14) (111/64 - 129/61)  BP(mean): --  RR: 18 (30 Oct 2017 09:14) (17 - 18)  SpO2: 97% (30 Oct 2017 09:14) (93% - 97%)  I&O's Detail      PHYSICAL EXAM:  GENERAL: NAD, well-groomed, well-developed  NECK: Supple, No JVD, Normal thyroid  NERVOUS SYSTEM:  Alert & Oriented X3, Good concentration; Motor Strength 5/5 B/L upper and lower extremities  CHEST/LUNG: Clear to auscultation bilaterally; No rales, rhonchi, wheezing, or rubs  HEART: Regular rate and rhythm; No murmurs, rubs, or gallops  ABDOMEN: Soft, Nontender, Nondistended; Bowel sounds present  EXTREMITIES:  2+ Peripheral Pulses, No clubbing, cyanosis, or edema        Hemoglobin A1C, Whole Blood: 5.4 % (10-28-17 @ 06:38)    MEDICATIONS  (STANDING):  aspirin  chewable 81 milliGRAM(s) Oral daily  calcium carbonate 1250 mG + Vitamin D (OsCal 500 + D) 1 Tablet(s) Oral daily  enoxaparin Injectable 40 milliGRAM(s) SubCutaneous daily  levothyroxine 50 MICROGram(s) Oral daily  losartan 50 milliGRAM(s) Oral daily  pantoprazole    Tablet 40 milliGRAM(s) Oral before breakfast  simvastatin 20 milliGRAM(s) Oral at bedtime    MEDICATIONS  (PRN):      RADIOLOGY & ADDITIONAL TESTS:

## 2017-10-30 NOTE — DISCHARGE NOTE ADULT - CARE PROVIDER_API CALL
Cachorro Guevara), Cardiology; Internal Medicine  [address]  [place] [address]  Phone: [phone]  Fax: [phone]    Jim Mills), Internal Medicine; Pulmonary Disease  [address] [place] [address]  Phone: [phone]  Fax: [phone]

## 2017-10-30 NOTE — DISCHARGE NOTE ADULT - PLAN OF CARE
Resolved Continue current medications as prescribed.  Follow up with cardiology in 1 week. Continue current medications as prescribed.

## 2017-11-01 ENCOUNTER — OTHER (OUTPATIENT)
Age: 82
End: 2017-11-01

## 2017-11-01 ENCOUNTER — APPOINTMENT (OUTPATIENT)
Dept: INTERNAL MEDICINE | Facility: CLINIC | Age: 82
End: 2017-11-01
Payer: MEDICARE

## 2017-11-01 VITALS
BODY MASS INDEX: 20.98 KG/M2 | SYSTOLIC BLOOD PRESSURE: 126 MMHG | RESPIRATION RATE: 16 BRPM | WEIGHT: 114 LBS | HEIGHT: 62 IN | TEMPERATURE: 98.1 F | OXYGEN SATURATION: 99 % | DIASTOLIC BLOOD PRESSURE: 60 MMHG | HEART RATE: 74 BPM

## 2017-11-01 DIAGNOSIS — R07.9 CHEST PAIN, UNSPECIFIED: ICD-10-CM

## 2017-11-01 DIAGNOSIS — R10.13 EPIGASTRIC PAIN: ICD-10-CM

## 2017-11-01 DIAGNOSIS — K57.90 DIVERTICULOSIS OF INTESTINE, PART UNSPECIFIED, W/OUT PERFORATION OR ABSCESS W/OUT BLEEDING: ICD-10-CM

## 2017-11-01 DIAGNOSIS — E03.9 HYPOTHYROIDISM, UNSPECIFIED: ICD-10-CM

## 2017-11-01 DIAGNOSIS — R07.89 OTHER CHEST PAIN: ICD-10-CM

## 2017-11-01 DIAGNOSIS — T78.3XXA ANGIONEUROTIC EDEMA, INITIAL ENCOUNTER: ICD-10-CM

## 2017-11-01 DIAGNOSIS — L20.9 ATOPIC DERMATITIS, UNSPECIFIED: ICD-10-CM

## 2017-11-01 DIAGNOSIS — Z78.9 OTHER SPECIFIED HEALTH STATUS: ICD-10-CM

## 2017-11-01 DIAGNOSIS — M11.20 OTHER CHONDROCALCINOSIS, UNSPECIFIED SITE: ICD-10-CM

## 2017-11-01 DIAGNOSIS — G56.03 CARPAL TUNNEL SYNDROM,BILATERAL UPPER LIMBS: ICD-10-CM

## 2017-11-01 DIAGNOSIS — I73.00 RAYNAUD'S SYNDROME W/OUT GANGRENE: ICD-10-CM

## 2017-11-01 DIAGNOSIS — Z86.19 PERSONAL HISTORY OF OTHER INFECTIOUS AND PARASITIC DISEASES: ICD-10-CM

## 2017-11-01 DIAGNOSIS — R80.1 PERSISTENT PROTEINURIA, UNSPECIFIED: ICD-10-CM

## 2017-11-01 DIAGNOSIS — S22.41XD MULTIPLE FRACTURES OF RIBS, RIGHT SIDE, SUBSEQUENT ENCOUNTER FOR FRACTURE WITH ROUTINE HEALING: ICD-10-CM

## 2017-11-01 DIAGNOSIS — G40.909 EPILEPSY, UNSPECIFIED, NOT INTRACTABLE, W/OUT STATUS EPILEPTICUS: ICD-10-CM

## 2017-11-01 DIAGNOSIS — S22.39XA FRACTURE OF ONE RIB, UNSPECIFIED SIDE, INITIAL ENCOUNTER FOR CLOSED FRACTURE: ICD-10-CM

## 2017-11-01 DIAGNOSIS — R42 DIZZINESS AND GIDDINESS: ICD-10-CM

## 2017-11-01 DIAGNOSIS — S96.911A STRAIN OF UNSPECIFIED MUSCLE AND TENDON AT ANKLE AND FOOT LEVEL, RIGHT FOOT, INITIAL ENCOUNTER: ICD-10-CM

## 2017-11-01 DIAGNOSIS — S46.819A STRAIN OF OTHER MUSCLES, FASCIA AND TENDONS AT SHOULDER AND UPPER ARM LEVEL, UNSPECIFIED ARM, INITIAL ENCOUNTER: ICD-10-CM

## 2017-11-01 PROCEDURE — 99213 OFFICE O/P EST LOW 20 MIN: CPT

## 2017-11-01 RX ORDER — UBIDECARENONE 200 MG
200 CAPSULE ORAL
Refills: 0 | Status: ACTIVE | COMMUNITY

## 2017-11-01 RX ORDER — CHOLECALCIFEROL (VITAMIN D3) 50 MCG
50 MCG TABLET ORAL
Refills: 0 | Status: ACTIVE | COMMUNITY

## 2017-11-01 RX ORDER — CALCIUM CARBONATE/VITAMIN D3 600 MG-20
TABLET ORAL
Refills: 0 | Status: ACTIVE | COMMUNITY

## 2017-11-13 ENCOUNTER — RX RENEWAL (OUTPATIENT)
Age: 82
End: 2017-11-13

## 2017-11-21 ENCOUNTER — RX RENEWAL (OUTPATIENT)
Age: 82
End: 2017-11-21

## 2017-11-30 ENCOUNTER — OTHER (OUTPATIENT)
Age: 82
End: 2017-11-30

## 2017-12-05 LAB
CHOLEST SERPL-MCNC: 133
GLUCOSE SERPL-MCNC: 63
HBA1C MFR BLD HPLC: 5.5
HDLC SERPL-MCNC: 60
LDLC SERPL CALC-MCNC: 54

## 2018-01-17 ENCOUNTER — NON-APPOINTMENT (OUTPATIENT)
Age: 83
End: 2018-01-17

## 2018-01-17 ENCOUNTER — APPOINTMENT (OUTPATIENT)
Dept: INTERNAL MEDICINE | Facility: CLINIC | Age: 83
End: 2018-01-17
Payer: MEDICARE

## 2018-01-17 VITALS
BODY MASS INDEX: 21.16 KG/M2 | HEART RATE: 76 BPM | TEMPERATURE: 98.3 F | SYSTOLIC BLOOD PRESSURE: 132 MMHG | OXYGEN SATURATION: 97 % | WEIGHT: 114.99 LBS | HEIGHT: 62 IN | DIASTOLIC BLOOD PRESSURE: 78 MMHG | RESPIRATION RATE: 18 BRPM

## 2018-01-17 PROCEDURE — 94010 BREATHING CAPACITY TEST: CPT

## 2018-01-17 PROCEDURE — 99214 OFFICE O/P EST MOD 30 MIN: CPT | Mod: 25

## 2018-03-07 ENCOUNTER — RX RENEWAL (OUTPATIENT)
Age: 83
End: 2018-03-07

## 2018-04-05 ENCOUNTER — APPOINTMENT (OUTPATIENT)
Dept: INTERNAL MEDICINE | Facility: CLINIC | Age: 83
End: 2018-04-05
Payer: MEDICARE

## 2018-04-05 PROCEDURE — 90471 IMMUNIZATION ADMIN: CPT | Mod: GY

## 2018-04-05 PROCEDURE — 90750 HZV VACC RECOMBINANT IM: CPT | Mod: GY,GA

## 2018-04-30 ENCOUNTER — APPOINTMENT (OUTPATIENT)
Dept: INTERNAL MEDICINE | Facility: CLINIC | Age: 83
End: 2018-04-30
Payer: MEDICARE

## 2018-04-30 VITALS
TEMPERATURE: 98.1 F | RESPIRATION RATE: 18 BRPM | OXYGEN SATURATION: 97 % | WEIGHT: 110 LBS | BODY MASS INDEX: 20.24 KG/M2 | HEIGHT: 62 IN | SYSTOLIC BLOOD PRESSURE: 114 MMHG | DIASTOLIC BLOOD PRESSURE: 72 MMHG | HEART RATE: 84 BPM

## 2018-04-30 DIAGNOSIS — F41.9 ANXIETY DISORDER, UNSPECIFIED: ICD-10-CM

## 2018-04-30 DIAGNOSIS — G47.00 INSOMNIA, UNSPECIFIED: ICD-10-CM

## 2018-04-30 PROCEDURE — 99213 OFFICE O/P EST LOW 20 MIN: CPT

## 2018-04-30 RX ORDER — PANTOPRAZOLE 40 MG/1
40 TABLET, DELAYED RELEASE ORAL
Qty: 30 | Refills: 0 | Status: DISCONTINUED | COMMUNITY
Start: 2017-10-30 | End: 2018-04-30

## 2018-07-02 ENCOUNTER — APPOINTMENT (OUTPATIENT)
Dept: INTERNAL MEDICINE | Facility: CLINIC | Age: 83
End: 2018-07-02
Payer: MEDICARE

## 2018-07-02 VITALS
HEIGHT: 62 IN | BODY MASS INDEX: 21.16 KG/M2 | OXYGEN SATURATION: 96 % | TEMPERATURE: 98.1 F | SYSTOLIC BLOOD PRESSURE: 122 MMHG | HEART RATE: 75 BPM | DIASTOLIC BLOOD PRESSURE: 58 MMHG | WEIGHT: 115 LBS | RESPIRATION RATE: 18 BRPM

## 2018-07-02 PROCEDURE — ZZZZZ: CPT

## 2018-07-02 PROCEDURE — 94729 DIFFUSING CAPACITY: CPT

## 2018-07-02 PROCEDURE — 94060 EVALUATION OF WHEEZING: CPT

## 2018-07-02 PROCEDURE — 99214 OFFICE O/P EST MOD 30 MIN: CPT | Mod: 25

## 2018-07-02 PROCEDURE — 94727 GAS DIL/WSHOT DETER LNG VOL: CPT

## 2018-07-02 RX ORDER — ALPRAZOLAM 0.25 MG/1
0.25 TABLET ORAL
Qty: 60 | Refills: 0 | Status: DISCONTINUED | COMMUNITY
Start: 2018-04-30 | End: 2018-07-02

## 2018-09-04 ENCOUNTER — RX RENEWAL (OUTPATIENT)
Age: 83
End: 2018-09-04

## 2018-09-14 ENCOUNTER — RX RENEWAL (OUTPATIENT)
Age: 83
End: 2018-09-14

## 2018-10-22 ENCOUNTER — APPOINTMENT (OUTPATIENT)
Dept: INTERNAL MEDICINE | Facility: CLINIC | Age: 83
End: 2018-10-22
Payer: MEDICARE

## 2018-10-22 VITALS
TEMPERATURE: 97.9 F | HEIGHT: 62 IN | SYSTOLIC BLOOD PRESSURE: 124 MMHG | WEIGHT: 114 LBS | RESPIRATION RATE: 16 BRPM | HEART RATE: 64 BPM | BODY MASS INDEX: 20.98 KG/M2 | DIASTOLIC BLOOD PRESSURE: 70 MMHG | OXYGEN SATURATION: 99 %

## 2018-10-22 DIAGNOSIS — Z87.09 PERSONAL HISTORY OF OTHER DISEASES OF THE RESPIRATORY SYSTEM: ICD-10-CM

## 2018-10-22 DIAGNOSIS — J06.9 ACUTE UPPER RESPIRATORY INFECTION, UNSPECIFIED: ICD-10-CM

## 2018-10-22 PROCEDURE — 99213 OFFICE O/P EST LOW 20 MIN: CPT

## 2018-10-22 RX ORDER — CEFUROXIME AXETIL 500 MG/1
500 TABLET ORAL
Qty: 14 | Refills: 1 | Status: COMPLETED | COMMUNITY
Start: 2018-10-22 | End: 2018-11-05

## 2018-10-22 NOTE — PHYSICAL EXAM
[No Acute Distress] : no acute distress [Well Nourished] : well nourished [Normal Outer Ear/Nose] : the outer ears and nose were normal in appearance [Normal Oropharynx] : the oropharynx was normal [No JVD] : no jugular venous distention [Supple] : supple [No Respiratory Distress] : no respiratory distress  [Clear to Auscultation] : lungs were clear to auscultation bilaterally [Normal Rate] : normal rate  [Regular Rhythm] : with a regular rhythm [Normal S1, S2] : normal S1 and S2 [No Edema] : there was no peripheral edema [No Extremity Clubbing/Cyanosis] : no extremity clubbing/cyanosis [No Joint Swelling] : no joint swelling [Grossly Normal Strength/Tone] : grossly normal strength/tone [Normal Affect] : the affect was normal [Normal Mood] : the mood was normal [Normal Insight/Judgement] : insight and judgment were intact

## 2018-10-22 NOTE — HISTORY OF PRESENT ILLNESS
[FreeTextEntry8] : here for FU.  For  the past week she has a cougha and nasal congestion.  She has a tickle in throat causing dry cough.  There i no PND or sore throat.  No self treatment.  She will be traveling to [facility] in December.

## 2018-10-22 NOTE — ASSESSMENT
[FreeTextEntry1] : \par She will call office in 2 weeks to check if Shingrix available.  Currently out of stock.  1st dose given 7/18.  \par Increase fluids\par Tylenol and robitussin prn\par Call if symptoms not improving 3-4 days.\par Complete ceftin.  She was provided a refill of this which she will bring on her trip to [facility].  \par

## 2018-12-03 ENCOUNTER — RX RENEWAL (OUTPATIENT)
Age: 83
End: 2018-12-03

## 2019-01-11 ENCOUNTER — APPOINTMENT (OUTPATIENT)
Dept: INTERNAL MEDICINE | Facility: CLINIC | Age: 84
End: 2019-01-11

## 2019-02-25 ENCOUNTER — RX RENEWAL (OUTPATIENT)
Age: 84
End: 2019-02-25

## 2019-03-11 LAB — HBA1C MFR BLD HPLC: 5.5

## 2019-03-25 ENCOUNTER — APPOINTMENT (OUTPATIENT)
Dept: INTERNAL MEDICINE | Facility: CLINIC | Age: 84
End: 2019-03-25
Payer: MEDICARE

## 2019-03-25 VITALS
RESPIRATION RATE: 14 BRPM | HEIGHT: 62 IN | HEART RATE: 82 BPM | TEMPERATURE: 98 F | SYSTOLIC BLOOD PRESSURE: 110 MMHG | WEIGHT: 115 LBS | DIASTOLIC BLOOD PRESSURE: 78 MMHG | BODY MASS INDEX: 21.16 KG/M2 | OXYGEN SATURATION: 96 %

## 2019-03-25 DIAGNOSIS — Z00.00 ENCOUNTER FOR GENERAL ADULT MEDICAL EXAMINATION W/OUT ABNORMAL FINDINGS: ICD-10-CM

## 2019-03-25 DIAGNOSIS — E89.0 POSTPROCEDURAL HYPOTHYROIDISM: ICD-10-CM

## 2019-03-25 PROCEDURE — G0438: CPT

## 2019-03-25 NOTE — PHYSICAL EXAM
[No Acute Distress] : no acute distress [Well Nourished] : well nourished [Well Developed] : well developed [Well-Appearing] : well-appearing [Normal Sclera/Conjunctiva] : normal sclera/conjunctiva [PERRL] : pupils equal round and reactive to light [Normal Outer Ear/Nose] : the outer ears and nose were normal in appearance [Normal Oropharynx] : the oropharynx was normal [No JVD] : no jugular venous distention [Supple] : supple [No Lymphadenopathy] : no lymphadenopathy [No Respiratory Distress] : no respiratory distress  [Clear to Auscultation] : lungs were clear to auscultation bilaterally [No Accessory Muscle Use] : no accessory muscle use [Normal Rate] : normal rate  [Regular Rhythm] : with a regular rhythm [Normal S1, S2] : normal S1 and S2 [No Murmur] : no murmur heard [No Abdominal Bruit] : a ~M bruit was not heard ~T in the abdomen [No Edema] : there was no peripheral edema [No Extremity Clubbing/Cyanosis] : no extremity clubbing/cyanosis [Soft] : abdomen soft [Non Tender] : non-tender [Non-distended] : non-distended [No HSM] : no HSM [Normal Bowel Sounds] : normal bowel sounds [Normal Anterior Cervical Nodes] : no anterior cervical lymphadenopathy [No CVA Tenderness] : no CVA  tenderness [No Spinal Tenderness] : no spinal tenderness [No Rash] : no rash [No Focal Deficits] : no focal deficits [Deep Tendon Reflexes (DTR)] : deep tendon reflexes were 2+ and symmetric [Normal Affect] : the affect was normal [Normal Insight/Judgement] : insight and judgment were intact

## 2019-03-25 NOTE — ASSESSMENT
[FreeTextEntry1] : #1 hypertension. Continue losartan. BP well controlled.\par \par #2 hyperlipidemia. Healthy lifestyle measures. simvastatin.\par \par #3 microscopic hematuria. Patient to see Dr. Raf Gonzalez urology, this week.\par \par #4 hypothyroidism. Levothyroxine.\par \par #5 health care maintenance.  flu and pneumococcal vaccinations are up-to-date.  Had FBW recently. For RV in 6 months.

## 2019-03-25 NOTE — COUNSELING
[Healthy eating counseling provided] : healthy eating [Activity counseling provided] : activity [Low Fat Diet] : Low fat diet [Low Salt Diet] : Low salt diet [___ min/wk activity recommended] : [unfilled] min/wk activity recommended [Walking] : Walking

## 2019-03-25 NOTE — HISTORY OF PRESENT ILLNESS
[FreeTextEntry1] : annual CPE. [de-identified] : This is the pleasant 87-year-old female who is here for her annual CPE. She is generally doing well and offers no complaints. Recent urinalysis did show 6-8 RBCs. She is not having any burning, frequency, or pain on urination. No bladder area discomfort or flank pain. No fever or chills. She is due to see Dr. Raf Gonzalez, urologist, in consultation this Friday.\par \par The patient did receive a flu vaccination in November of last year.\par \par She does get in exercise every day.

## 2019-03-29 ENCOUNTER — APPOINTMENT (OUTPATIENT)
Dept: UROLOGY | Facility: CLINIC | Age: 84
End: 2019-03-29
Payer: MEDICARE

## 2019-03-29 VITALS
HEIGHT: 62 IN | SYSTOLIC BLOOD PRESSURE: 109 MMHG | BODY MASS INDEX: 21.16 KG/M2 | HEART RATE: 74 BPM | TEMPERATURE: 97.9 F | WEIGHT: 115 LBS | DIASTOLIC BLOOD PRESSURE: 63 MMHG

## 2019-03-29 PROCEDURE — 99203 OFFICE O/P NEW LOW 30 MIN: CPT

## 2019-03-29 NOTE — REVIEW OF SYSTEMS
[Told you have blood in urine on a urine test] : told blood was present in a urine test [Negative] : Heme/Lymph

## 2019-04-02 LAB
APPEARANCE: CLEAR
BACTERIA UR CULT: NORMAL
BACTERIA: NEGATIVE
BILIRUB UR QL STRIP: NEGATIVE
BILIRUBIN URINE: NEGATIVE
BLOOD URINE: NEGATIVE
CLARITY UR: CLEAR
COLLECTION METHOD: NORMAL
COLOR: YELLOW
GLUCOSE QUALITATIVE U: NEGATIVE
GLUCOSE UR-MCNC: NEGATIVE
HCG UR QL: 0.2 EU/DL
HGB UR QL STRIP.AUTO: NEGATIVE
HYALINE CASTS: 1 /LPF
KETONES UR-MCNC: NEGATIVE
KETONES URINE: NEGATIVE
LEUKOCYTE ESTERASE UR QL STRIP: NEGATIVE
LEUKOCYTE ESTERASE URINE: NEGATIVE
MICROSCOPIC-UA: NORMAL
NITRITE UR QL STRIP: NEGATIVE
NITRITE URINE: NEGATIVE
PH UR STRIP: 6
PH URINE: 6
PROT UR STRIP-MCNC: NEGATIVE
PROTEIN URINE: NEGATIVE
RED BLOOD CELLS URINE: 3 /HPF
SP GR UR STRIP: 1.01
SPECIFIC GRAVITY URINE: 1.01
SQUAMOUS EPITHELIAL CELLS: 0 /HPF
UROBILINOGEN URINE: NORMAL
WHITE BLOOD CELLS URINE: 0 /HPF

## 2019-04-02 NOTE — HISTORY OF PRESENT ILLNESS
[FreeTextEntry1] : 87 year old woman  with complaint of microscopic hematuria. This began on 3/7/19 when 6-8 RBCs/hpf was seen on routine screening UA. \par It is mild in severity as the patient denies any gross hematuria. Nothing makes symptoms occur. \par It is associated with nothing.\par No gross hematuria, no dysuria, no frequency, no urgency, no hesitancy, no straining. No incontinence. \par No fevers, no chills, no nausea, no vomiting, no flank pain. \par No family history contributory to microscopic hematuria.

## 2019-04-02 NOTE — ASSESSMENT
[FreeTextEntry1] : 87 year old woman with microscopic hematuria. We discussed that the differential diagnosis includes both benign and malignant conditions including renal stones, BPH, urinary tract infections, and cancer of the bladder or ureter or kidney. Cystoscopy was recommended to rule out pathology in the bladder. CT Urogram was recommended to evaluate for presence of nephroureteral stones or malignancies. Urinalysis and urine culture were recommended to check for urinary tract infection. Pt agrees and understands.

## 2019-04-04 ENCOUNTER — FORM ENCOUNTER (OUTPATIENT)
Age: 84
End: 2019-04-04

## 2019-04-05 ENCOUNTER — OUTPATIENT (OUTPATIENT)
Dept: OUTPATIENT SERVICES | Facility: HOSPITAL | Age: 84
LOS: 1 days | End: 2019-04-05
Payer: MEDICARE

## 2019-04-05 ENCOUNTER — APPOINTMENT (OUTPATIENT)
Dept: CT IMAGING | Facility: CLINIC | Age: 84
End: 2019-04-05
Payer: MEDICARE

## 2019-04-05 DIAGNOSIS — Z98.890 OTHER SPECIFIED POSTPROCEDURAL STATES: Chronic | ICD-10-CM

## 2019-04-05 DIAGNOSIS — R31.29 OTHER MICROSCOPIC HEMATURIA: ICD-10-CM

## 2019-04-05 DIAGNOSIS — Z90.49 ACQUIRED ABSENCE OF OTHER SPECIFIED PARTS OF DIGESTIVE TRACT: Chronic | ICD-10-CM

## 2019-04-05 PROCEDURE — 74178 CT ABD&PLV WO CNTR FLWD CNTR: CPT

## 2019-04-05 PROCEDURE — 82565 ASSAY OF CREATININE: CPT

## 2019-04-05 PROCEDURE — 74178 CT ABD&PLV WO CNTR FLWD CNTR: CPT | Mod: 26

## 2019-04-12 ENCOUNTER — APPOINTMENT (OUTPATIENT)
Age: 84
End: 2019-04-12
Payer: MEDICARE

## 2019-04-12 VITALS
DIASTOLIC BLOOD PRESSURE: 68 MMHG | HEART RATE: 74 BPM | OXYGEN SATURATION: 96 % | WEIGHT: 115 LBS | TEMPERATURE: 97.8 F | SYSTOLIC BLOOD PRESSURE: 154 MMHG | BODY MASS INDEX: 21.16 KG/M2 | HEIGHT: 62 IN

## 2019-04-12 DIAGNOSIS — R31.29 OTHER MICROSCOPIC HEMATURIA: ICD-10-CM

## 2019-04-12 PROCEDURE — 52000 CYSTOURETHROSCOPY: CPT

## 2019-04-14 RX ORDER — CEFADROXIL 500 MG/1
500 CAPSULE ORAL
Qty: 14 | Refills: 0 | Status: COMPLETED | COMMUNITY
Start: 2018-12-19

## 2019-04-23 LAB — URINE CYTOLOGY: NORMAL

## 2019-05-29 ENCOUNTER — RX RENEWAL (OUTPATIENT)
Age: 84
End: 2019-05-29

## 2019-05-30 ENCOUNTER — RX RENEWAL (OUTPATIENT)
Age: 84
End: 2019-05-30

## 2019-08-26 ENCOUNTER — RX RENEWAL (OUTPATIENT)
Age: 84
End: 2019-08-26

## 2019-11-14 ENCOUNTER — RX RENEWAL (OUTPATIENT)
Age: 84
End: 2019-11-14

## 2019-11-25 ENCOUNTER — RX RENEWAL (OUTPATIENT)
Age: 84
End: 2019-11-25

## 2020-01-22 ENCOUNTER — EMERGENCY (EMERGENCY)
Facility: HOSPITAL | Age: 85
LOS: 1 days | Discharge: HOME | End: 2020-01-22
Attending: EMERGENCY MEDICINE
Payer: MEDICARE

## 2020-01-22 VITALS
HEART RATE: 92 BPM | RESPIRATION RATE: 18 BRPM | SYSTOLIC BLOOD PRESSURE: 170 MMHG | HEIGHT: 62 IN | TEMPERATURE: 98 F | OXYGEN SATURATION: 95 % | DIASTOLIC BLOOD PRESSURE: 71 MMHG | WEIGHT: 115.08 LBS

## 2020-01-22 DIAGNOSIS — Z98.890 OTHER SPECIFIED POSTPROCEDURAL STATES: Chronic | ICD-10-CM

## 2020-01-22 DIAGNOSIS — Z90.49 ACQUIRED ABSENCE OF OTHER SPECIFIED PARTS OF DIGESTIVE TRACT: Chronic | ICD-10-CM

## 2020-01-22 PROCEDURE — 99284 EMERGENCY DEPT VISIT MOD MDM: CPT

## 2020-01-22 PROCEDURE — 71045 X-RAY EXAM CHEST 1 VIEW: CPT

## 2020-01-22 PROCEDURE — 99283 EMERGENCY DEPT VISIT LOW MDM: CPT | Mod: 25

## 2020-01-22 PROCEDURE — 71045 X-RAY EXAM CHEST 1 VIEW: CPT | Mod: 26

## 2020-01-22 PROCEDURE — 93005 ELECTROCARDIOGRAM TRACING: CPT

## 2020-01-22 PROCEDURE — 94640 AIRWAY INHALATION TREATMENT: CPT

## 2020-01-22 PROCEDURE — 93010 ELECTROCARDIOGRAM REPORT: CPT

## 2020-01-22 RX ORDER — ALBUTEROL 90 UG/1
1 AEROSOL, METERED ORAL ONCE
Refills: 0 | Status: COMPLETED | OUTPATIENT
Start: 2020-01-22 | End: 2020-01-22

## 2020-01-22 RX ADMIN — ALBUTEROL 1 PUFF(S): 90 AEROSOL, METERED ORAL at 02:19

## 2020-01-22 NOTE — ED PROVIDER NOTE - OBJECTIVE STATEMENT
pt presents to ED 2/2 1 week non productive cough placed on abx and decongestant by UC she came tonight 2/2 [facility] for persistent cough. denies fever. denies HA or neck pain. no chest pain or sob. no abd pain. no n/v/d. no urinary f/u/d. no back pain. no motor or sensory deficits. denies illicit drug use. no recent travel. no rash. no other acute issues symptoms or concerns  no hemoptysis

## 2020-01-22 NOTE — ED PROVIDER NOTE - CLINICAL SUMMARY MEDICAL DECISION MAKING FREE TEXT BOX
continue outpt abx rx by  will add inhaler no cp or sob at time of my evlauation return to ed for intractable cp sob or any overall worsening f/u pcp without fail this week advised. yes

## 2020-01-22 NOTE — ED PROVIDER NOTE - PATIENT PORTAL LINK FT
You can access the FollowMyHealth Patient Portal offered by [facility] by registering at the following website: [email or web address]. By joining [employer]’s FollowMyHealth portal, you will also be able to view your health information using other applications (apps) compatible with our system.

## 2020-02-16 NOTE — ED PROVIDER NOTE - PSYCHIATRIC NEGATIVE STATEMENT, MLM
Addended by: JHONATAN HOOVER on: 2/16/2020 04:30 PM     Modules accepted: Orders     no known mental health issues.

## 2020-02-19 ENCOUNTER — RX RENEWAL (OUTPATIENT)
Age: 85
End: 2020-02-19

## 2020-05-01 ENCOUNTER — RX RENEWAL (OUTPATIENT)
Age: 85
End: 2020-05-01

## 2020-07-11 ENCOUNTER — APPOINTMENT (OUTPATIENT)
Dept: ORTHOPEDIC SURGERY | Facility: CLINIC | Age: 85
End: 2020-07-11
Payer: MEDICARE

## 2020-07-11 VITALS
SYSTOLIC BLOOD PRESSURE: 103 MMHG | HEIGHT: 62 IN | WEIGHT: 115 LBS | HEART RATE: 81 BPM | BODY MASS INDEX: 21.16 KG/M2 | DIASTOLIC BLOOD PRESSURE: 52 MMHG

## 2020-07-11 VITALS — TEMPERATURE: 97.8 F

## 2020-07-11 DIAGNOSIS — M72.2 PLANTAR FASCIAL FIBROMATOSIS: ICD-10-CM

## 2020-07-11 DIAGNOSIS — M25.571 PAIN IN RIGHT ANKLE AND JOINTS OF RIGHT FOOT: ICD-10-CM

## 2020-07-11 DIAGNOSIS — M25.572 PAIN IN RIGHT ANKLE AND JOINTS OF RIGHT FOOT: ICD-10-CM

## 2020-07-11 DIAGNOSIS — M76.821 POSTERIOR TIBIAL TENDINITIS, RIGHT LEG: ICD-10-CM

## 2020-07-11 DIAGNOSIS — M67.88 OTHER SPECIFIED DISORDERS OF SYNOVIUM AND TENDON, OTHER SITE: ICD-10-CM

## 2020-07-11 DIAGNOSIS — M21.42 FLAT FOOT [PES PLANUS] (ACQUIRED), RIGHT FOOT: ICD-10-CM

## 2020-07-11 DIAGNOSIS — M79.671 PAIN IN RIGHT FOOT: ICD-10-CM

## 2020-07-11 DIAGNOSIS — M76.822 POSTERIOR TIBIAL TENDINITIS, RIGHT LEG: ICD-10-CM

## 2020-07-11 DIAGNOSIS — M21.41 FLAT FOOT [PES PLANUS] (ACQUIRED), RIGHT FOOT: ICD-10-CM

## 2020-07-11 DIAGNOSIS — M79.672 PAIN IN RIGHT FOOT: ICD-10-CM

## 2020-07-11 PROCEDURE — 73610 X-RAY EXAM OF ANKLE: CPT | Mod: 50

## 2020-07-11 PROCEDURE — 99204 OFFICE O/P NEW MOD 45 MIN: CPT

## 2020-07-11 PROCEDURE — 73630 X-RAY EXAM OF FOOT: CPT | Mod: 50

## 2020-07-11 NOTE — REASON FOR VISIT
[Friend] : friend [Initial Visit] : an initial visit for [FreeTextEntry2] : Bilateral ankle/foot pain

## 2020-07-11 NOTE — PHYSICAL EXAM
[de-identified] : Bilateral Ankle Physical Examination:\par \par General: Alert and oriented x3.  In no acute distress.  Pleasant in nature with a normal affect.  No apparent respiratory distress. \par Erythema, Warmth, Rubor: Negative\par Swelling: Negative\par \par ROM:\par 1. Dorsiflexion: 10 degrees on the left, 0 degrees on the right.  More stiffness right ankle the left ankle.\par 2. Plantarflexion: 40 degrees\par 3. Inversion: 10 degrees\par 4. Eversion: 10 degrees\par \par Tenderness to Palpation: \par 1. Lateral Malleolus: Negative\par 2. Medial Malleolus: Negative\par 3. Proximal Fibular Pain: Negative\par 4. Heel Pain: Negative\par 5. Cuboid: Negative\par 6. Navicular: Negative\par 7. Tibiotalar Joint: Negative\par 8. Subtalar Joint: Negative\par 9. Posterior Recess: Negative\par \par Tendon Pain:\par 1. Achilles: Positive bilaterally\par 2. Peroneals: Negative\par 3. Posterior Tibialis: Positive bilaterally\par 4. Tibialis Anterior: Negative\par \par Ligament Pain:\par 1. ATFL: Negative\par 2. CFL: Negative \par 3. PTFL: Negative\par 4. Deltoid Ligaments: Negative\par 5. Lis Franc Ligament: Negative\par \par *Positive pain with palpating the plantar fascia insertion bilateral heels.\par \par Stability: \par 1. Anterior Drawer: Negative\par 2. Posterior Drawer: Negative\par \par Strength: 5/5 TA/GS/EHL\par \par Pulses: 2+ DP/PT Pulses\par \par Neuro: Intact motor and sensory\par \par Additional Test:\par 1. Calcaneal Squeeze Test: Negative\par 2. Syndesmosis Squeeze Test: Negative\par  [de-identified] : X-rays of the bilateral ankle/foot taken in office today and reviewed with the patient showed: Calcaneal tuberosity heel spur is noted. Calcium within the Achilles tendon. Pess planus noted bilaterally. Mild osteoarthritis tibiotalar joint bilaterally. Hallux rigidus noted bilaterally.\par

## 2020-07-11 NOTE — HISTORY OF PRESENT ILLNESS
[de-identified] : Tg is an 88-year-old female who presents to the office with an acute onset of bilateral ankle and bilateral foot pain. Her pain scale in the right foot is 7/10, left foot 5/10. She states the pain is the bottom portion of her heel, back portion of her heel, and inside aspects of both ankles. She presents wearing support sandals today in the office using a walker. She denies numbness and tingling in both feet.  No other complaints.

## 2020-07-13 PROBLEM — M67.88 ACHILLES TENDINOSIS OF BOTH LOWER EXTREMITIES: Status: ACTIVE | Noted: 2020-07-11

## 2020-11-02 ENCOUNTER — RX RENEWAL (OUTPATIENT)
Age: 85
End: 2020-11-02

## 2020-12-04 ENCOUNTER — APPOINTMENT (OUTPATIENT)
Dept: ORTHOPEDIC SURGERY | Facility: CLINIC | Age: 85
End: 2020-12-04
Payer: MEDICARE

## 2020-12-04 VITALS
HEART RATE: 82 BPM | WEIGHT: 115 LBS | SYSTOLIC BLOOD PRESSURE: 111 MMHG | HEIGHT: 62 IN | BODY MASS INDEX: 21.16 KG/M2 | DIASTOLIC BLOOD PRESSURE: 53 MMHG

## 2020-12-04 DIAGNOSIS — M25.462 EFFUSION, LEFT KNEE: ICD-10-CM

## 2020-12-04 PROCEDURE — 73562 X-RAY EXAM OF KNEE 3: CPT | Mod: 50

## 2020-12-04 PROCEDURE — 20610 DRAIN/INJ JOINT/BURSA W/O US: CPT | Mod: 50

## 2020-12-04 PROCEDURE — 99214 OFFICE O/P EST MOD 30 MIN: CPT | Mod: 25

## 2020-12-04 NOTE — END OF VISIT
[FreeTextEntry3] : I, Damien Keller, acted solely as a scribe for Dr. Harvey Garcia on this date 12/04/2020.

## 2020-12-04 NOTE — REVIEW OF SYSTEMS
[Joint Pain] : joint pain [Joint Stiffness] : joint stiffness [Joint Swelling] : joint swelling [Chills] : chills [Feeling Tired] : fatigue [Sleep Disturbances] : ~T sleep disturbances [Negative] : Heme/Lymph [FreeTextEntry9] : b/l knee

## 2020-12-04 NOTE — DISCUSSION/SUMMARY
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 Conservative therapy and surgical options discussed in detail with the patient. She is not a candidate for a staged b/l TKA at this time. We explained to the patient that the chondrocalcinosis is causing an effusion in her b/l knees. She has a larger effusion in the left knee than in the right knee. As a result, we aspirated the left knee today which she tolerated well. We aspirated 23 cc's of inflammatory synovial fluid today. The inflammatory synovial fluid may be due to pseudogout. The pt would like to continue with conservative therapies for pain management. The patient inquired about gel injections. I explained to the patient that she would not have relief from the gel injections due to having an effusion. The patient understands that if she were to have gel injections, the swelling would need to be under control. Pt opted to receive a cortisone injection in the b/l knee today and tolerated it well. F/u with us in three months for repeat cortisone injections if needed.

## 2020-12-04 NOTE — PROCEDURE
[de-identified] : I aspirated 23 cc's of inflammatory synovial fluid from the patients left knee \par \par \par \par I injected the patient's b/l knee today with cortisone.\par \par I discussed at length with the patient the planned steroid and lidocaine injection. The risks, benefits, convalescence and alternatives were reviewed. The possible side effects discussed included but were not limited to: pain, swelling, heat, bleeding, and redness. Symptoms are generally mild but if they are extensive then contact the office. Giving pain relievers by mouth such as NSAIDs or Tylenol can generally treat the reactions to steroid and lidocaine. Rare cases of infection have been noted. Rash, hives and itching may occur post injection. If you have muscle pain or cramps, flushing and or swelling of the face, rapid heart beat, nausea, dizziness, fever, chills, headache, difficulty breathing, swelling in the arms or legs, or have a prickly feeling of your skin, contact a health care provider immediately. Following this discussion, the knee was prepped with Alcohol and under sterile condition the 80 mg Depo-Medrol and 6 cc Lidocaine injection was performed with a 20 gauge needle through a superolateral injection site. The needle was introduced into the joint, aspiration was performed to ensure intra-articular placement and the medication was injected. Upon withdrawal of the needle the site was cleaned with alcohol and a band aid applied. The patient tolerated the injection well and there were no adverse effects. Post injection instructions included no strenuous activity for 24 hours, cryotherapy and if there are any adverse effects to contact the office.

## 2020-12-04 NOTE — HISTORY OF PRESENT ILLNESS
[Pain Location] : pain [6] : a minimum pain level of 6/10 [10] : a maximum pain level of 10/10 [Constant] : ~He/She~ states the symptoms seem to be constant [Bending] : worsened by bending [Ice] : relieved by ice [Walking] : worsened by walking [Knee Flexion] : worsened with knee flexion [___ mths] : [unfilled] month(s) ago [de-identified] : 89 y/o F presents with b/l knee pain which started in the summer of 2020 without injury. She previous saw Dr. Milton Montanez for this pain. The pain is generalized and  constant. She had cortisone injection in the past and helped. She denies any recent falls or injury. She previously had three aspirations in the right knee. She describes the pain as sharp, throbbing, and stabbing. Ice alleviates her pain whereas bending and lying down exacerbates her pain.  [Ataxia] : no ataxia [Incontinence] : no incontinence [Loss of Dexterity] : good dexterity [Urinary Ret.] : no urinary retention [de-identified] : lying down

## 2020-12-04 NOTE — PHYSICAL EXAM
[LE] : Sensory: Intact in bilateral lower extremities [ALL] : dorsalis pedis, posterior tibial, femoral, popliteal, and radial 2+ and symmetric bilaterally [Antalgic] : antalgic [de-identified] : GENERAL APPEARANCE: Well nourished and hydrated, pleasant, alert, and oriented x 3. Appears their stated age. \par HEENT: Normocephalic, extraocular eye motion intact. Nasal septum midline. Oral cavity clear. External auditory canal clear. \par RESPIRATORY: Breath sounds clear and audible in all lobes. No wheezing, No accessory muscle use.\par CARDIOVASCULAR: No apparent abnormalities. No lower leg edema. No varicosities. Pedal pulses are palpable.\par NEUROLOGIC: Sensation is normal, no muscle weakness in the upper or lower extremities.\par DERMATOLOGIC: No apparent skin lesions, moist, warm, no rash.\par SPINE: Cervical spine appears normal and moves freely; thoracic spine appears normal and moves freely; lumbosacral spine appears normal and moves freely, normal, nontender.\par MUSCULOSKELETAL: Hands, wrists, and elbows are normal and move freely, shoulders are normal and move freely.  [de-identified] : b/l knee exam shows mild effusion left worse then right\par ROM is 0-90 degree with pain.  [de-identified] : 3V xray of the b/l knees done in the office today and reviewed by Dr. 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

## 2020-12-16 PROBLEM — J06.9 UPPER RESPIRATORY INFECTION, ACUTE: Status: RESOLVED | Noted: 2018-10-22 | Resolved: 2020-12-16

## 2020-12-16 PROBLEM — Z87.09 HISTORY OF ACUTE BRONCHITIS: Status: RESOLVED | Noted: 2018-10-22 | Resolved: 2020-12-16

## 2021-02-19 ENCOUNTER — APPOINTMENT (OUTPATIENT)
Dept: ORTHOPEDIC SURGERY | Facility: CLINIC | Age: 86
End: 2021-02-19

## 2021-03-08 ENCOUNTER — APPOINTMENT (OUTPATIENT)
Dept: ORTHOPEDIC SURGERY | Facility: CLINIC | Age: 86
End: 2021-03-08
Payer: MEDICARE

## 2021-03-08 VITALS
BODY MASS INDEX: 21.16 KG/M2 | SYSTOLIC BLOOD PRESSURE: 103 MMHG | DIASTOLIC BLOOD PRESSURE: 50 MMHG | WEIGHT: 115 LBS | HEART RATE: 75 BPM | HEIGHT: 62 IN

## 2021-03-08 PROCEDURE — 99214 OFFICE O/P EST MOD 30 MIN: CPT | Mod: 25

## 2021-03-08 PROCEDURE — 20610 DRAIN/INJ JOINT/BURSA W/O US: CPT

## 2021-03-08 PROCEDURE — 99072 ADDL SUPL MATRL&STAF TM PHE: CPT

## 2021-03-08 NOTE — HISTORY OF PRESENT ILLNESS
[Pain Location] : pain [___ mths] : [unfilled] month(s) ago [6] : a minimum pain level of 6/10 [10] : a maximum pain level of 10/10 [Constant] : ~He/She~ states the symptoms seem to be constant [Bending] : worsened by bending [Walking] : worsened by walking [Knee Flexion] : worsened with knee flexion [Ice] : relieved by ice [Acetaminophen] : relieved by acetaminophen [de-identified] : 89 y/o F presents with b/l knee pain which started in the summer of 2020 without injury. She previous saw Dr. Milton Montanez for this pain. The pain is generalized and  constant. She previously had three aspirations in the right knee. She describes the pain as sharp, throbbing, and stabbing. At her last appointment, we aspirated the left knee and provided a cortisone injection in the b/l knees. She explains that the aspiration and cortisone injections provided relief. She notes that the cortisone injection provided relief until last week. She has been taking Tylenol for pain management. The patient reports recent diagnoses of Achilles tendonitis, posterior tibial tendonitis, and plantar fasciitis [Ataxia] : no ataxia [Incontinence] : no incontinence [Loss of Dexterity] : good dexterity [Urinary Ret.] : no urinary retention [de-identified] : lying down

## 2021-03-08 NOTE — DISCUSSION/SUMMARY
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 Conservative therapy and surgical options discussed in detail with the patient. She is not a candidate for a staged b/l TKA at this time. We explained to the patient that the chondrocalcinosis is causing an effusion in her b/l knees. The pt would like to continue with conservative therapies for pain management. Pt opted to receive a cortisone injection in the b/l knee today and tolerated it well. The patient is interested in receiving gel injections; therefore, we ordered them today. F/u when the gel injections are in the office.

## 2021-03-08 NOTE — PHYSICAL EXAM
[Antalgic] : antalgic [LE] : Sensory: Intact in bilateral lower extremities [ALL] : dorsalis pedis, posterior tibial, femoral, popliteal, and radial 2+ and symmetric bilaterally [de-identified] : GENERAL APPEARANCE: Well nourished and hydrated, pleasant, alert, and oriented x 3. Appears their stated age. \par HEENT: Normocephalic, extraocular eye motion intact. Nasal septum midline. Oral cavity clear. External auditory canal clear. \par RESPIRATORY: Breath sounds clear and audible in all lobes. No wheezing, No accessory muscle use.\par CARDIOVASCULAR: No apparent abnormalities. No lower leg edema. No varicosities. Pedal pulses are palpable.\par NEUROLOGIC: Sensation is normal, no muscle weakness in the upper or lower extremities.\par DERMATOLOGIC: No apparent skin lesions, moist, warm, no rash.\par SPINE: Cervical spine appears normal and moves freely; thoracic spine appears normal and moves freely; lumbosacral spine appears normal and moves freely, normal, nontender.\par MUSCULOSKELETAL: Hands, wrists, and elbows are normal and move freely, shoulders are normal and move freely.  [de-identified] : b/l knee exam shows mild effusion left worse then right\par ROM is 0-90 degree with pain.

## 2021-03-08 NOTE — HISTORY OF PRESENT ILLNESS
[Pain Location] : pain [___ mths] : [unfilled] month(s) ago [6] : a minimum pain level of 6/10 [10] : a maximum pain level of 10/10 [Constant] : ~He/She~ states the symptoms seem to be constant [Bending] : worsened by bending [Walking] : worsened by walking [Knee Flexion] : worsened with knee flexion [Ice] : relieved by ice [Acetaminophen] : relieved by acetaminophen [de-identified] : 88 y/o F presents with b/l knee pain which started in the summer of 2020 without injury. She previous saw Dr. Milton Montanez for this pain. The pain is generalized and  constant. She previously had three aspirations in the right knee. She describes the pain as sharp, throbbing, and stabbing. At her last appointment, we aspirated the left knee and provided a cortisone injection in the b/l knees. She explains that the aspiration and cortisone injections provided relief. She notes that the cortisone injection provided relief until last week. She has been taking Tylenol for pain management. The patient reports recent diagnoses of Achilles tendonitis, posterior tibial tendonitis, and plantar fasciitis [Ataxia] : no ataxia [Incontinence] : no incontinence [Loss of Dexterity] : good dexterity [Urinary Ret.] : no urinary retention [de-identified] : lying down

## 2021-03-08 NOTE — DISCUSSION/SUMMARY
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 Conservative therapy and surgical options discussed in detail with the patient. She is not a candidate for a staged b/l TKA at this time. We explained to the patient that the chondrocalcinosis is causing an effusion in her b/l knees. The pt would like to continue with conservative therapies for pain management. Pt opted to receive a cortisone injection in the b/l knee today and tolerated it well. The patient is interested in receiving gel injections; therefore, we ordered them today. F/u when the gel injections are in the office.

## 2021-03-08 NOTE — PHYSICAL EXAM
[Antalgic] : antalgic [LE] : Sensory: Intact in bilateral lower extremities [ALL] : dorsalis pedis, posterior tibial, femoral, popliteal, and radial 2+ and symmetric bilaterally [de-identified] : GENERAL APPEARANCE: Well nourished and hydrated, pleasant, alert, and oriented x 3. Appears their stated age. \par HEENT: Normocephalic, extraocular eye motion intact. Nasal septum midline. Oral cavity clear. External auditory canal clear. \par RESPIRATORY: Breath sounds clear and audible in all lobes. No wheezing, No accessory muscle use.\par CARDIOVASCULAR: No apparent abnormalities. No lower leg edema. No varicosities. Pedal pulses are palpable.\par NEUROLOGIC: Sensation is normal, no muscle weakness in the upper or lower extremities.\par DERMATOLOGIC: No apparent skin lesions, moist, warm, no rash.\par SPINE: Cervical spine appears normal and moves freely; thoracic spine appears normal and moves freely; lumbosacral spine appears normal and moves freely, normal, nontender.\par MUSCULOSKELETAL: Hands, wrists, and elbows are normal and move freely, shoulders are normal and move freely.  [de-identified] : b/l knee exam shows mild effusion left worse then right\par ROM is 0-90 degree with pain.

## 2021-03-08 NOTE — PROCEDURE
[de-identified] : I injected the patient's b/l knee today with cortisone.\par \par I discussed at length with the patient the planned steroid and lidocaine injection. The risks, benefits, convalescence and alternatives were reviewed. The possible side effects discussed included but were not limited to: pain, swelling, heat, bleeding, and redness. Symptoms are generally mild but if they are extensive then contact the office. Giving pain relievers by mouth such as NSAIDs or Tylenol can generally treat the reactions to steroid and lidocaine. Rare cases of infection have been noted. Rash, hives and itching may occur post injection. If you have muscle pain or cramps, flushing and or swelling of the face, rapid heart beat, nausea, dizziness, fever, chills, headache, difficulty breathing, swelling in the arms or legs, or have a prickly feeling of your skin, contact a health care provider immediately. Following this discussion, the knee was prepped with Alcohol and under sterile condition the 80 mg Depo-Medrol and 6 cc Lidocaine injection was performed with a 20 gauge needle through a superolateral injection site. The needle was introduced into the joint, aspiration was performed to ensure intra-articular placement and the medication was injected. Upon withdrawal of the needle the site was cleaned with alcohol and a band aid applied. The patient tolerated the injection well and there were no adverse effects. Post injection instructions included no strenuous activity for 24 hours, cryotherapy and if there are any adverse effects to contact the office.

## 2021-03-08 NOTE — PROCEDURE
[de-identified] : I injected the patient's b/l knee today with cortisone.\par \par I discussed at length with the patient the planned steroid and lidocaine injection. The risks, benefits, convalescence and alternatives were reviewed. The possible side effects discussed included but were not limited to: pain, swelling, heat, bleeding, and redness. Symptoms are generally mild but if they are extensive then contact the office. Giving pain relievers by mouth such as NSAIDs or Tylenol can generally treat the reactions to steroid and lidocaine. Rare cases of infection have been noted. Rash, hives and itching may occur post injection. If you have muscle pain or cramps, flushing and or swelling of the face, rapid heart beat, nausea, dizziness, fever, chills, headache, difficulty breathing, swelling in the arms or legs, or have a prickly feeling of your skin, contact a health care provider immediately. Following this discussion, the knee was prepped with Alcohol and under sterile condition the 80 mg Depo-Medrol and 6 cc Lidocaine injection was performed with a 20 gauge needle through a superolateral injection site. The needle was introduced into the joint, aspiration was performed to ensure intra-articular placement and the medication was injected. Upon withdrawal of the needle the site was cleaned with alcohol and a band aid applied. The patient tolerated the injection well and there were no adverse effects. Post injection instructions included no strenuous activity for 24 hours, cryotherapy and if there are any adverse effects to contact the office.

## 2021-03-08 NOTE — END OF VISIT
[FreeTextEntry3] : I, Damien Keller, acted solely as a scribe for Dr. Harvey Garcia on this date 03/08/2021.

## 2021-04-05 ENCOUNTER — APPOINTMENT (OUTPATIENT)
Dept: ORTHOPEDIC SURGERY | Facility: CLINIC | Age: 86
End: 2021-04-05
Payer: MEDICARE

## 2021-04-05 PROCEDURE — 99213 OFFICE O/P EST LOW 20 MIN: CPT

## 2021-04-05 NOTE — DISCUSSION/SUMMARY
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 Conservative therapy and surgical options discussed in detail with the patient. She is not a candidate for a staged b/l TKA at this time. The patient received a cortisone injection on 3/8/2021 which is providing relief. She is scheduled to start gel injections today; however, she does not have pain today. As a result, she did not get the gel injections today. She will contact the office when her pain returns and we will provide the gel injections.

## 2021-04-05 NOTE — END OF VISIT
[FreeTextEntry3] : I, Damien Keller, acted solely as a scribe for Dr. Harvey Garcia on this date 04/05/2021.

## 2021-04-05 NOTE — HISTORY OF PRESENT ILLNESS
[0] : a minimum pain level of 0/10 [9] : a maximum pain level of 9/10 [Walking] : worsened by walking [Rest] : relieved by rest [de-identified] : 89 y/o F presents with b/l knee pain which started in the summer of 2020 without injury. At her last appointment, which was 3/8/2021, she received a cortisone injection and explains that she does not have any pain today. She was scheduled to have start gel injections today. [de-identified] : cortisone

## 2021-04-05 NOTE — PHYSICAL EXAM
[Antalgic] : antalgic [LE] : Sensory: Intact in bilateral lower extremities [ALL] : dorsalis pedis, posterior tibial, femoral, popliteal, and radial 2+ and symmetric bilaterally [de-identified] : GENERAL APPEARANCE: Well nourished and hydrated, pleasant, alert, and oriented x 3. Appears their stated age. \par HEENT: Normocephalic, extraocular eye motion intact. Nasal septum midline. Oral cavity clear. External auditory canal clear. \par RESPIRATORY: Breath sounds clear and audible in all lobes. No wheezing, No accessory muscle use.\par CARDIOVASCULAR: No apparent abnormalities. No lower leg edema. No varicosities. Pedal pulses are palpable.\par NEUROLOGIC: Sensation is normal, no muscle weakness in the upper or lower extremities.\par DERMATOLOGIC: No apparent skin lesions, moist, warm, no rash.\par SPINE: Cervical spine appears normal and moves freely; thoracic spine appears normal and moves freely; lumbosacral spine appears normal and moves freely, normal, nontender.\par MUSCULOSKELETAL: Hands, wrists, and elbows are normal and move freely, shoulders are normal and move freely.  [de-identified] : b/l knee exam shows mild effusion left worse then right\par ROM is 0-90 degree

## 2021-04-12 ENCOUNTER — APPOINTMENT (OUTPATIENT)
Dept: ORTHOPEDIC SURGERY | Facility: CLINIC | Age: 86
End: 2021-04-12

## 2021-04-19 ENCOUNTER — APPOINTMENT (OUTPATIENT)
Dept: ORTHOPEDIC SURGERY | Facility: CLINIC | Age: 86
End: 2021-04-19

## 2021-04-26 ENCOUNTER — RX RENEWAL (OUTPATIENT)
Age: 86
End: 2021-04-26

## 2021-05-03 ENCOUNTER — APPOINTMENT (OUTPATIENT)
Dept: ORTHOPEDIC SURGERY | Facility: CLINIC | Age: 86
End: 2021-05-03
Payer: MEDICARE

## 2021-05-03 VITALS — TEMPERATURE: 96.3 F

## 2021-05-03 PROCEDURE — 99213 OFFICE O/P EST LOW 20 MIN: CPT | Mod: 25

## 2021-05-03 PROCEDURE — 20610 DRAIN/INJ JOINT/BURSA W/O US: CPT | Mod: 50

## 2021-05-03 NOTE — END OF VISIT
[FreeTextEntry3] : I, Damien Keller, acted solely as a scribe for Dr. Harvey Garcia on this date 05/03/2021.

## 2021-05-03 NOTE — PROCEDURE
[de-identified] : I injected the pt's b/l knee today with 1/3 Euflexxa. \par \par Lot #: [id number]\par Exp date: 2021-10-13\par \par Knee injection viscosupplementation: I discussed at length with the patient the planned injection. The risks, benefits, convalescence and alternatives were reviewed. The possible side effects discussed included but were not limited to: pain, swelling, heat and redness. There symptoms are generally mild but if they are extensive then contact the office. Giving pain relievers by mouth such as NSAID's or Tylenol can generally treat the reactions to injection. Rare cases of infection have been noted. Rash, hives and itching may occur post injection. If you have muscle pain or cramps, flushing and or swelling of the face, rapid heart beat, nausea, dizziness, fever, chills, headache, difficulty breathing, swelling in the arms or legs, or have a prickly feeling of your skin, contact a health care provider immediately. Following this discussion, the knee was prepped with alcohol and under sterile condition the injection was performed through a superolateral injection site with a 21 gauge needle. The needle was introduced into the joint, aspiration was performed to ensure intra-articular placement and the medication was injected. Upon withdrawal of the needle the site was cleaned with alcohol and a band aid applied. The patient tolerated the injection well and there were no adverse effects. Post injection instructions included no strenuous activity for 24 hours, cryotherapy and if there are any adverse effects to contact the office.

## 2021-05-03 NOTE — DISCUSSION/SUMMARY
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 Conservative therapy and surgical options discussed in detail with the patient. She is not a candidate for a staged b/l TKA at this time; however, she may be a candidate in the future. She has been responding well to conservative treatment and encourage her to continue with them. She opted to receive 1/3 Euflexxa injections in the bilateral knees today which she tolerated well. F/u next week for 2/3 Euflexxa injections.

## 2021-05-03 NOTE — REVIEW OF SYSTEMS
[Joint Pain] : joint pain [Chills] : chills [Feeling Tired] : fatigue [Sleep Disturbances] : ~T sleep disturbances [Negative] : Heme/Lymph [FreeTextEntry9] : b/l knee

## 2021-05-03 NOTE — PHYSICAL EXAM
[Antalgic] : antalgic [LE] : Sensory: Intact in bilateral lower extremities [ALL] : dorsalis pedis, posterior tibial, femoral, popliteal, and radial 2+ and symmetric bilaterally [de-identified] : GENERAL APPEARANCE: Well nourished and hydrated, pleasant, alert, and oriented x 3. Appears their stated age. \par HEENT: Normocephalic, extraocular eye motion intact. Nasal septum midline. Oral cavity clear. External auditory canal clear. \par RESPIRATORY: Breath sounds clear and audible in all lobes. No wheezing, No accessory muscle use.\par CARDIOVASCULAR: No apparent abnormalities. No lower leg edema. No varicosities. Pedal pulses are palpable.\par NEUROLOGIC: Sensation is normal, no muscle weakness in the upper or lower extremities.\par DERMATOLOGIC: No apparent skin lesions, moist, warm, no rash.\par SPINE: Cervical spine appears normal and moves freely; thoracic spine appears normal and moves freely; lumbosacral spine appears normal and moves freely, normal, nontender.\par MUSCULOSKELETAL: Hands, wrists, and elbows are normal and move freely, shoulders are normal and move freely.  [de-identified] : b/l knee exam shows mild effusion left worse then right\par ROM is 0-90 degree

## 2021-05-03 NOTE — HISTORY OF PRESENT ILLNESS
[Pain Location] : pain [5] : a current pain level of 5/10 [0] : a minimum pain level of 0/10 [9] : a maximum pain level of 9/10 [Walking] : worsened by walking [Rest] : relieved by rest [de-identified] : 88 y/o F presents with b/l knee pain which started in the summer of 2020 without injury. On 3/8/2021, she received a cortisone injection which provided relief for 2 months. She is here today to start gel injections.  [de-identified] : cortisone

## 2021-05-10 ENCOUNTER — APPOINTMENT (OUTPATIENT)
Dept: ORTHOPEDIC SURGERY | Facility: CLINIC | Age: 86
End: 2021-05-10
Payer: MEDICARE

## 2021-05-10 PROCEDURE — 20610 DRAIN/INJ JOINT/BURSA W/O US: CPT | Mod: 50

## 2021-05-10 NOTE — DISCUSSION/SUMMARY
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 Conservative therapy and surgical options discussed in detail with the patient. She is not a candidate for a staged b/l TKA at this time; however, she may be a candidate in the future. She has been responding well to conservative treatment and encourage her to continue with them. She opted to receive 2/3 Euflexxa injections in the bilateral knees today which she tolerated well. F/u next week for 3/3 Euflexxa injections.

## 2021-05-10 NOTE — PROCEDURE
[de-identified] : I injected the pt's b/l knee today with 2/3 Euflexxa. \par \par Lot #: [id number]\par Exp date: 2021-10-13\par \par Knee injection viscosupplementation: I discussed at length with the patient the planned injection. The risks, benefits, convalescence and alternatives were reviewed. The possible side effects discussed included but were not limited to: pain, swelling, heat and redness. There symptoms are generally mild but if they are extensive then contact the office. Giving pain relievers by mouth such as NSAID's or Tylenol can generally treat the reactions to injection. Rare cases of infection have been noted. Rash, hives and itching may occur post injection. If you have muscle pain or cramps, flushing and or swelling of the face, rapid heart beat, nausea, dizziness, fever, chills, headache, difficulty breathing, swelling in the arms or legs, or have a prickly feeling of your skin, contact a health care provider immediately. Following this discussion, the knee was prepped with alcohol and under sterile condition the injection was performed through a superolateral injection site with a 21 gauge needle. The needle was introduced into the joint, aspiration was performed to ensure intra-articular placement and the medication was injected. Upon withdrawal of the needle the site was cleaned with alcohol and a band aid applied. The patient tolerated the injection well and there were no adverse effects. Post injection instructions included no strenuous activity for 24 hours, cryotherapy and if there are any adverse effects to contact the office.

## 2021-05-17 ENCOUNTER — APPOINTMENT (OUTPATIENT)
Dept: ORTHOPEDIC SURGERY | Facility: CLINIC | Age: 86
End: 2021-05-17
Payer: MEDICARE

## 2021-05-17 DIAGNOSIS — M17.11 UNILATERAL PRIMARY OSTEOARTHRITIS, RIGHT KNEE: ICD-10-CM

## 2021-05-17 DIAGNOSIS — M17.12 UNILATERAL PRIMARY OSTEOARTHRITIS, LEFT KNEE: ICD-10-CM

## 2021-05-17 PROCEDURE — 20610 DRAIN/INJ JOINT/BURSA W/O US: CPT | Mod: 50

## 2021-05-17 NOTE — DISCUSSION/SUMMARY
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 Conservative therapy and surgical options discussed in detail with the patient. She is not a candidate for a staged b/l TKA at this time; however, she may be a candidate in the future. She has been responding well to conservative treatment and encourage her to continue with them. She opted to receive 3/3 Euflexxa injections in the bilateral knees today which she tolerated well. F/u in 3-6M for repeat cortisone injection or Euflexxa injection  as needed\par  \par

## 2021-05-17 NOTE — PROCEDURE
[de-identified] : I injected the pt's b/l knee today with 3/3 Euflexxa. \par \par Lot #: [id number]\par Exp date: 2021-10-13\par \par Knee injection viscosupplementation: I discussed at length with the patient the planned injection. The risks, benefits, convalescence and alternatives were reviewed. The possible side effects discussed included but were not limited to: pain, swelling, heat and redness. There symptoms are generally mild but if they are extensive then contact the office. Giving pain relievers by mouth such as NSAID's or Tylenol can generally treat the reactions to injection. Rare cases of infection have been noted. Rash, hives and itching may occur post injection. If you have muscle pain or cramps, flushing and or swelling of the face, rapid heart beat, nausea, dizziness, fever, chills, headache, difficulty breathing, swelling in the arms or legs, or have a prickly feeling of your skin, contact a health care provider immediately. Following this discussion, the knee was prepped with alcohol and under sterile condition the injection was performed through a superolateral injection site with a 21 gauge needle. The needle was introduced into the joint, aspiration was performed to ensure intra-articular placement and the medication was injected. Upon withdrawal of the needle the site was cleaned with alcohol and a band aid applied. The patient tolerated the injection well and there were no adverse effects. Post injection instructions included no strenuous activity for 24 hours, cryotherapy and if there are any adverse effects to contact the office. \par

## 2021-10-08 ENCOUNTER — RX RENEWAL (OUTPATIENT)
Age: 86
End: 2021-10-08

## 2021-10-11 ENCOUNTER — RX RENEWAL (OUTPATIENT)
Age: 86
End: 2021-10-11

## 2021-11-16 ENCOUNTER — EMERGENCY (EMERGENCY)
Facility: HOSPITAL | Age: 86
LOS: 1 days | Discharge: HOME | End: 2021-11-16
Attending: EMERGENCY MEDICINE
Payer: MEDICARE

## 2021-11-16 VITALS
DIASTOLIC BLOOD PRESSURE: 65 MMHG | RESPIRATION RATE: 20 BRPM | WEIGHT: 115.08 LBS | HEIGHT: 62 IN | OXYGEN SATURATION: 100 % | SYSTOLIC BLOOD PRESSURE: 187 MMHG | TEMPERATURE: 98 F | HEART RATE: 100 BPM

## 2021-11-16 VITALS
OXYGEN SATURATION: 97 % | DIASTOLIC BLOOD PRESSURE: 78 MMHG | SYSTOLIC BLOOD PRESSURE: 162 MMHG | TEMPERATURE: 98 F | HEART RATE: 103 BPM | RESPIRATION RATE: 18 BRPM

## 2021-11-16 DIAGNOSIS — Z98.890 OTHER SPECIFIED POSTPROCEDURAL STATES: Chronic | ICD-10-CM

## 2021-11-16 DIAGNOSIS — Z90.49 ACQUIRED ABSENCE OF OTHER SPECIFIED PARTS OF DIGESTIVE TRACT: Chronic | ICD-10-CM

## 2021-11-16 LAB
ALBUMIN SERPL ELPH-MCNC: 4.5 G/DL — SIGNIFICANT CHANGE UP (ref 3.3–5.2)
ALP SERPL-CCNC: 76 U/L — SIGNIFICANT CHANGE UP (ref 40–120)
ALT FLD-CCNC: 20 U/L — SIGNIFICANT CHANGE UP
ANION GAP SERPL CALC-SCNC: 14 MMOL/L — SIGNIFICANT CHANGE UP (ref 5–17)
AST SERPL-CCNC: 29 U/L — SIGNIFICANT CHANGE UP
BASOPHILS # BLD AUTO: 0.02 K/UL — SIGNIFICANT CHANGE UP (ref 0–0.2)
BASOPHILS NFR BLD AUTO: 0.2 % — SIGNIFICANT CHANGE UP (ref 0–2)
BILIRUB SERPL-MCNC: 0.4 MG/DL — SIGNIFICANT CHANGE UP (ref 0.4–2)
BUN SERPL-MCNC: 20.5 MG/DL — HIGH (ref 8–20)
CALCIUM SERPL-MCNC: 10.3 MG/DL — HIGH (ref 8.6–10.2)
CHLORIDE SERPL-SCNC: 96 MMOL/L — LOW (ref 98–107)
CK SERPL-CCNC: 38 U/L — SIGNIFICANT CHANGE UP (ref 25–170)
CO2 SERPL-SCNC: 23 MMOL/L — SIGNIFICANT CHANGE UP (ref 22–29)
CREAT SERPL-MCNC: 0.63 MG/DL — SIGNIFICANT CHANGE UP (ref 0.5–1.3)
EOSINOPHIL # BLD AUTO: 0.06 K/UL — SIGNIFICANT CHANGE UP (ref 0–0.5)
EOSINOPHIL NFR BLD AUTO: 0.6 % — SIGNIFICANT CHANGE UP (ref 0–6)
GLUCOSE SERPL-MCNC: 100 MG/DL — HIGH (ref 70–99)
HCT VFR BLD CALC: 38.2 % — SIGNIFICANT CHANGE UP (ref 34.5–45)
HGB BLD-MCNC: 13.1 G/DL — SIGNIFICANT CHANGE UP (ref 11.5–15.5)
IMM GRANULOCYTES NFR BLD AUTO: 0.3 % — SIGNIFICANT CHANGE UP (ref 0–1.5)
LIDOCAIN IGE QN: 27 U/L — SIGNIFICANT CHANGE UP (ref 22–51)
LYMPHOCYTES # BLD AUTO: 1.78 K/UL — SIGNIFICANT CHANGE UP (ref 1–3.3)
LYMPHOCYTES # BLD AUTO: 19.1 % — SIGNIFICANT CHANGE UP (ref 13–44)
MCHC RBC-ENTMCNC: 31.3 PG — SIGNIFICANT CHANGE UP (ref 27–34)
MCHC RBC-ENTMCNC: 34.3 GM/DL — SIGNIFICANT CHANGE UP (ref 32–36)
MCV RBC AUTO: 91.2 FL — SIGNIFICANT CHANGE UP (ref 80–100)
MONOCYTES # BLD AUTO: 0.7 K/UL — SIGNIFICANT CHANGE UP (ref 0–0.9)
MONOCYTES NFR BLD AUTO: 7.5 % — SIGNIFICANT CHANGE UP (ref 2–14)
NEUTROPHILS # BLD AUTO: 6.72 K/UL — SIGNIFICANT CHANGE UP (ref 1.8–7.4)
NEUTROPHILS NFR BLD AUTO: 72.3 % — SIGNIFICANT CHANGE UP (ref 43–77)
NT-PROBNP SERPL-SCNC: 78 PG/ML — SIGNIFICANT CHANGE UP (ref 0–300)
PLATELET # BLD AUTO: 251 K/UL — SIGNIFICANT CHANGE UP (ref 150–400)
POTASSIUM SERPL-MCNC: 4.1 MMOL/L — SIGNIFICANT CHANGE UP (ref 3.5–5.3)
POTASSIUM SERPL-SCNC: 4.1 MMOL/L — SIGNIFICANT CHANGE UP (ref 3.5–5.3)
PROT SERPL-MCNC: 7.8 G/DL — SIGNIFICANT CHANGE UP (ref 6.6–8.7)
RBC # BLD: 4.19 M/UL — SIGNIFICANT CHANGE UP (ref 3.8–5.2)
RBC # FLD: 13.5 % — SIGNIFICANT CHANGE UP (ref 10.3–14.5)
SODIUM SERPL-SCNC: 133 MMOL/L — LOW (ref 135–145)
TROPONIN T SERPL-MCNC: <0.01 NG/ML — SIGNIFICANT CHANGE UP (ref 0–0.06)
WBC # BLD: 9.31 K/UL — SIGNIFICANT CHANGE UP (ref 3.8–10.5)
WBC # FLD AUTO: 9.31 K/UL — SIGNIFICANT CHANGE UP (ref 3.8–10.5)

## 2021-11-16 PROCEDURE — 99284 EMERGENCY DEPT VISIT MOD MDM: CPT | Mod: 25

## 2021-11-16 PROCEDURE — 93010 ELECTROCARDIOGRAM REPORT: CPT

## 2021-11-16 PROCEDURE — 83690 ASSAY OF LIPASE: CPT

## 2021-11-16 PROCEDURE — 74174 CTA ABD&PLVS W/CONTRAST: CPT | Mod: 26,MA

## 2021-11-16 PROCEDURE — 82550 ASSAY OF CK (CPK): CPT

## 2021-11-16 PROCEDURE — 71275 CT ANGIOGRAPHY CHEST: CPT | Mod: MA

## 2021-11-16 PROCEDURE — 80053 COMPREHEN METABOLIC PANEL: CPT

## 2021-11-16 PROCEDURE — 83880 ASSAY OF NATRIURETIC PEPTIDE: CPT

## 2021-11-16 PROCEDURE — 74174 CTA ABD&PLVS W/CONTRAST: CPT | Mod: MA

## 2021-11-16 PROCEDURE — 85025 COMPLETE CBC W/AUTO DIFF WBC: CPT

## 2021-11-16 PROCEDURE — 84484 ASSAY OF TROPONIN QUANT: CPT

## 2021-11-16 PROCEDURE — 99285 EMERGENCY DEPT VISIT HI MDM: CPT

## 2021-11-16 PROCEDURE — 76705 ECHO EXAM OF ABDOMEN: CPT

## 2021-11-16 PROCEDURE — 71250 CT THORAX DX C-: CPT | Mod: 26,59,MA

## 2021-11-16 PROCEDURE — 36415 COLL VENOUS BLD VENIPUNCTURE: CPT

## 2021-11-16 PROCEDURE — 96374 THER/PROPH/DIAG INJ IV PUSH: CPT | Mod: XU

## 2021-11-16 PROCEDURE — 76705 ECHO EXAM OF ABDOMEN: CPT | Mod: 26,RT

## 2021-11-16 PROCEDURE — 96375 TX/PRO/DX INJ NEW DRUG ADDON: CPT | Mod: XU

## 2021-11-16 PROCEDURE — 71250 CT THORAX DX C-: CPT | Mod: MA

## 2021-11-16 PROCEDURE — 71275 CT ANGIOGRAPHY CHEST: CPT | Mod: 26,MA

## 2021-11-16 PROCEDURE — 93005 ELECTROCARDIOGRAM TRACING: CPT

## 2021-11-16 RX ORDER — ONDANSETRON 8 MG/1
1 TABLET, FILM COATED ORAL
Qty: 9 | Refills: 0
Start: 2021-11-16 | End: 2021-11-18

## 2021-11-16 RX ORDER — SODIUM CHLORIDE 9 MG/ML
1000 INJECTION INTRAMUSCULAR; INTRAVENOUS; SUBCUTANEOUS ONCE
Refills: 0 | Status: COMPLETED | OUTPATIENT
Start: 2021-11-16 | End: 2021-11-16

## 2021-11-16 RX ORDER — ONDANSETRON 8 MG/1
4 TABLET, FILM COATED ORAL ONCE
Refills: 0 | Status: COMPLETED | OUTPATIENT
Start: 2021-11-16 | End: 2021-11-16

## 2021-11-16 RX ORDER — MAGNESIUM SULFATE 500 MG/ML
2 VIAL (ML) INJECTION ONCE
Refills: 0 | Status: COMPLETED | OUTPATIENT
Start: 2021-11-16 | End: 2021-11-16

## 2021-11-16 RX ORDER — FAMOTIDINE 10 MG/ML
1 INJECTION INTRAVENOUS
Qty: 30 | Refills: 0
Start: 2021-11-16 | End: 2021-12-15

## 2021-11-16 RX ORDER — FAMOTIDINE 10 MG/ML
20 INJECTION INTRAVENOUS ONCE
Refills: 0 | Status: COMPLETED | OUTPATIENT
Start: 2021-11-16 | End: 2021-11-16

## 2021-11-16 RX ADMIN — SODIUM CHLORIDE 1000 MILLILITER(S): 9 INJECTION INTRAMUSCULAR; INTRAVENOUS; SUBCUTANEOUS at 17:14

## 2021-11-16 RX ADMIN — Medication 50 GRAM(S): at 17:20

## 2021-11-16 RX ADMIN — ONDANSETRON 4 MILLIGRAM(S): 8 TABLET, FILM COATED ORAL at 17:20

## 2021-11-16 RX ADMIN — FAMOTIDINE 20 MILLIGRAM(S): 10 INJECTION INTRAVENOUS at 17:20

## 2021-11-16 NOTE — ED PROVIDER NOTE - PROGRESS NOTE DETAILS
I performed the initial face to face bedside interview with this patient regarding history of present illness, review of symptoms and past medical, social and family history.  I completed an independent physical examination.  I was the initial provider who evaluated this patient.  The history, review of symptoms and examination was documented by the scribe in my presence and I attest to the accuracy of the documentation.  I have signed out the follow up of any pending tests (i.e. labs, radiological studies) to the PA.  I have discussed the patient’s plan of care and disposition with the PA. KHUSHBOO SOTO: PT evaluated by intake physician. HPI/PE/ROS as noted above. Will follow up plan per intake physician   reporting nausea no vomiting for the alst week and feeling off. no chest pain no sob no abdominal pain no urinary symptoms no diarrhea no melena no brbpr. no weightloss. no hx of COPD non smoker. reporting cough for over 1 month and GERD symptoms. no pain with eating reported. no fever no chills   no cardiac hx no headache no dizziness no visual changes   findings discussed with patient. and advised on need for further imaging. pt agreeable to work-up at this time.   ordered for telemetry incidental findings on CT. no AAA or dissection  will give FU with PULM and GI for findings   dc on pepcid   strict return precautions

## 2021-11-16 NOTE — ED ADULT TRIAGE NOTE - PRO INTERPRETER NEED 2
Cardiology Note    Subjective   Patient ID: Yamilet Wolfe is a 65 year old female.    Chief Complaint   Patient presents with   • Follow-up   • Office Visit       HPI   Patient was going to cardiac rehab here.  Had to stop since Covid.  She is exercising at home, has an reclining elliptical, and some light weights.  Patient had a life vest, and with improvement in ejection fraction, no longer needs life vest.   Most recent echo shows an EF of 40-45%.          Patient's medications, allergies, past medical, surgical, social and family histories were reviewed and updated as appropriate.    Review of Systems   Constitution: Negative for decreased appetite, diaphoresis and weight loss.   HENT: Negative for congestion.    Eyes: Negative for visual disturbance.   Cardiovascular: Negative.    Respiratory: Negative for cough, hemoptysis, shortness of breath, sleep disturbances due to breathing, snoring, sputum production and wheezing.    Hematologic/Lymphatic: Negative for bleeding problem. Does not bruise/bleed easily.   Skin: Negative for flushing and rash.   Musculoskeletal: Negative for arthritis, falls, joint pain and muscle cramps.   Gastrointestinal: Negative for abdominal pain, diarrhea, hematemesis, hematochezia, melena, nausea and vomiting.   Neurological: Negative for excessive daytime sleepiness, dizziness, headaches, light-headedness, loss of balance and weakness.   Psychiatric/Behavioral: Negative for altered mental status. The patient is not nervous/anxious.          Objective     Visit Vitals  BP 96/54   Pulse 74   Ht 5' 2\" (1.575 m)   Wt 109 kg (240 lb 3.2 oz)   BMI 43.93 kg/m²     Physical Exam   Constitutional: She is oriented to person, place, and time. She appears well-developed and well-nourished.   HENT:   Head: Normocephalic.   Eyes: Pupils are equal, round, and reactive to light. Conjunctivae and EOM are normal.   Neck: Neck supple. No JVD present. No tracheal deviation present.   Cardiovascular:  Normal rate, regular rhythm, normal heart sounds and intact distal pulses. PMI is not displaced. Exam reveals no gallop.   No murmur heard.  Pulses:       Carotid pulses are 2+ on the right side and 2+ on the left side.       Dorsalis pedis pulses are 2+ on the right side and 2+ on the left side.        Posterior tibial pulses are 2+ on the right side and 2+ on the left side.   Pulmonary/Chest: Effort normal and breath sounds normal. No respiratory distress. She has no wheezes. She has no rales. She exhibits no tenderness.   Abdominal: Soft. Bowel sounds are normal. There is no abdominal tenderness.   Musculoskeletal:         General: No deformity or edema.   Neurological: She is alert and oriented to person, place, and time.   Skin: Skin is warm and dry.   Psychiatric: She has a normal mood and affect. Her behavior is normal.   Nursing note and vitals reviewed.          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 Viral.  Has improved ejection fraction to 40-45%.  No longer has a life vest.  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  Hopefully, she is able to resume cardiac rehab.    Coronary artery disease involving native coronary artery without angina pectoris  Has non-ischemic cardiomyopathy.  Non-obstructive CAD on cardiac cath.    Essential hypertension  Hypertension is improving with treatment.  Continue current treatment regimen.  Dietary sodium restriction.  Weight loss.  Regular aerobic exercise.  Blood pressure will be reassessed at the next regular appointment.    Dyslipidemia  Blood work reviewed and has been started on lipitor.      Orders Placed This Encounter   • ENTRESTO  MG per tablet     Sig: Take 1 tablet by mouth 2 times daily.     Dispense:  90 tablet     Refill:  3   • carvedilol (COREG) 25 MG tablet     Sig: Take 1 tablet by mouth 2 times daily (with meals).      Dispense:  90 tablet     Refill:  3       Rupinder Almaraz DO       English

## 2021-11-16 NOTE — ED PROVIDER NOTE - CARE PROVIDERS DIRECT ADDRESSES
[email or web address]

## 2021-11-16 NOTE — ED PROVIDER NOTE - PROVIDER TOKENS
PROVIDER:[TOKEN:[[id number]],FOLLOWUP:[Routine]],PROVIDER:[TOKEN:[[id number]],FOLLOWUP:[Routine]]

## 2021-11-16 NOTE — ED ADULT TRIAGE NOTE - CHIEF COMPLAINT QUOTE
"I'm having N.V burning in my chest, diarrhea and feeling flushed" pt has symptoms for a couple of days

## 2021-11-16 NOTE — ED PROVIDER NOTE - NSFOLLOWUPINSTRUCTIONS_ED_ALL_ED_FT
please bring all results with you for follow up with primary care doctor   please follow with pulmonologist referral and with Gastroenterologist referral   pepcid daily   continue all other prescribed medications   new or worsening symptoms return to the Emergency Department

## 2021-11-16 NOTE — ED PROVIDER NOTE - OBJECTIVE STATEMENT
89 Y/ O F with h/o recent viral infection with chronic cough leading to gerd since jan p/w worsening burning in the epigastrium and chest and worsening cough for last few days. No fever, chills, weight loss but feels nauseous. NOn smoker, no recent travel or sick contact. Pt is vaccinated for covid and was testetd neg for covid antibody earlier when had chest infection in jan 2021. Pt has tried albuetreol, flonase, and different things with no releif and now has worsneing gerd symptoms

## 2021-11-16 NOTE — ED PROVIDER NOTE - CARE PROVIDER_API CALL
Tyree Jackson (DO)  Critical Care Medicine; Internal Medicine; Pulmonary Disease  [address], [address]  [place] [address]  Phone: [phone]  Fax: [phone]  Follow Up Time: Routine    Jaycee Nunes (DO)  Gastroenterology  [address], [address]  [place] [address]  Phone: [phone]  Fax: [phone]  Follow Up Time: Routine

## 2021-11-16 NOTE — ED PROVIDER NOTE - CLINICAL SUMMARY MEDICAL DECISION MAKING FREE TEXT BOX
plan to check labs, r/o  acs, chf, pna, will do ct chest and us abd for chronic complaints, pt appears stable if all neg, can f/u with pulmonology outpatient

## 2021-11-16 NOTE — ED PROVIDER NOTE - PATIENT PORTAL LINK FT
You can access the FollowMyHealth Patient Portal offered by [facility] by registering at the following website: [email or web address]. By joining [employer]’s FollowMyHealth portal, you will also be able to view your health information using other applications (apps) compatible with our system.
Home

## 2021-11-16 NOTE — ED PROVIDER NOTE - NSICDXPASTSURGICALHX_GEN_ALL_CORE_FT
PAST SURGICAL HISTORY:  H/O craniotomy due to seizures. Had resection of one of the cranial nerve (? 8th) on right side as per patient.    S/P appendectomy

## 2021-11-16 NOTE — ED PROVIDER NOTE - NSICDXFAMILYHX_GEN_ALL_CORE_FT
FAMILY HISTORY:  Father  Still living? Unknown  Family history of heart disease, Age at diagnosis: Age Unknown    Sibling  Still living? Unknown  Family history of heart disease, Age at diagnosis: Age Unknown

## 2021-11-16 NOTE — ED ADULT NURSE NOTE - OBJECTIVE STATEMENT
+ nauseousness, pt has felt like she's going to vomit; felt flush, like a burning sensation in her stomach and her chest; + cough

## 2021-11-29 ENCOUNTER — APPOINTMENT (OUTPATIENT)
Dept: PULMONOLOGY | Facility: CLINIC | Age: 86
End: 2021-11-29
Payer: MEDICARE

## 2021-11-29 VITALS
BODY MASS INDEX: 21.16 KG/M2 | WEIGHT: 115 LBS | DIASTOLIC BLOOD PRESSURE: 70 MMHG | OXYGEN SATURATION: 97 % | HEART RATE: 68 BPM | RESPIRATION RATE: 14 BRPM | HEIGHT: 62 IN | SYSTOLIC BLOOD PRESSURE: 138 MMHG

## 2021-11-29 DIAGNOSIS — R09.82 POSTNASAL DRIP: ICD-10-CM

## 2021-11-29 PROCEDURE — 99204 OFFICE O/P NEW MOD 45 MIN: CPT

## 2021-11-29 RX ORDER — LEVOTHYROXINE SODIUM 0.05 MG/1
50 TABLET ORAL
Qty: 30 | Refills: 0 | Status: ACTIVE | COMMUNITY
Start: 2017-08-11

## 2021-11-29 NOTE — ASSESSMENT
[FreeTextEntry1] : 89 yo lady , generally healthy, referred for evaluation of cough\par Nonsmoker, no acute recent problems\par \par Chart review:\par Hypertesnion on meds Losartan\par HLD on meds simvastatin\par Hypothyroid on Replacement 50mcg\par Previous CNS surgery on eighth nerve with hearing aid\par Previous seizures\par Hx zoster\par \par 89 yo lady with no identifiable pulmonary disease by history or by imaging\par Recommend trial of nasal steroids for PND\par Return PRN

## 2021-11-29 NOTE — PHYSICAL EXAM
[No Acute Distress] : no acute distress [Normal Oropharynx] : normal oropharynx [Normal Appearance] : normal appearance [No Neck Mass] : no neck mass [Normal Rate/Rhythm] : normal rate/rhythm [Normal S1, S2] : normal s1, s2 [No Murmurs] : no murmurs [No Resp Distress] : no resp distress [Clear to Auscultation Bilaterally] : clear to auscultation bilaterally [No Abnormalities] : no abnormalities [Benign] : benign [Normal Gait] : normal gait [No Clubbing] : no clubbing [No Cyanosis] : no cyanosis [No Edema] : no edema [FROM] : FROM [Normal Color/ Pigmentation] : normal color/ pigmentation [No Focal Deficits] : no focal deficits [Oriented x3] : oriented x3 [Normal Affect] : normal affect [TextBox_2] : Amazingly spry for her stated age

## 2021-11-29 NOTE — HISTORY OF PRESENT ILLNESS
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

## 2022-01-10 ENCOUNTER — RX RENEWAL (OUTPATIENT)
Age: 87
End: 2022-01-10

## 2022-01-21 ENCOUNTER — APPOINTMENT (OUTPATIENT)
Dept: GASTROENTEROLOGY | Facility: CLINIC | Age: 87
End: 2022-01-21

## 2022-04-25 ENCOUNTER — EMERGENCY (EMERGENCY)
Facility: HOSPITAL | Age: 87
LOS: 1 days | Discharge: HOME | End: 2022-04-25
Attending: EMERGENCY MEDICINE
Payer: MEDICARE

## 2022-04-25 VITALS
WEIGHT: 115.08 LBS | TEMPERATURE: 98 F | HEIGHT: 62 IN | SYSTOLIC BLOOD PRESSURE: 156 MMHG | HEART RATE: 79 BPM | DIASTOLIC BLOOD PRESSURE: 64 MMHG | OXYGEN SATURATION: 95 % | RESPIRATION RATE: 18 BRPM

## 2022-04-25 DIAGNOSIS — Z90.49 ACQUIRED ABSENCE OF OTHER SPECIFIED PARTS OF DIGESTIVE TRACT: Chronic | ICD-10-CM

## 2022-04-25 DIAGNOSIS — Z98.890 OTHER SPECIFIED POSTPROCEDURAL STATES: Chronic | ICD-10-CM

## 2022-04-25 PROCEDURE — 99284 EMERGENCY DEPT VISIT MOD MDM: CPT

## 2022-04-25 NOTE — ED PROVIDER NOTE - NSFOLLOWUPINSTRUCTIONS_ED_ALL_ED_FT
- Follow up with your doctor within 2-3 days.   - Bring results with you to the appointment.   - Take Tylenol (Acetaminophen) 650mg or Motrin (Ibuprofen/Advil) 600mg every 6 hours as needed for pain.   - Return to the ED for any new or worsening symptoms.     Cough    Coughing is a reflex that clears your throat and your airways. Coughing helps to heal and protect your lungs. It is normal to cough occasionally, but a cough that happens with other symptoms or lasts a long time may be a sign of a condition that needs treatment. Coughing may be caused by infections, asthma or COPD, smoking, postnasal drip, gastroesophageal reflux, as well as other medical conditions. Take medicines only as instructed by your health care provider. Avoid environments or triggers that causes you to cough at work or at home.    SEEK IMMEDIATE MEDICAL CARE IF YOU HAVE ANY OF THE FOLLOWING SYMPTOMS: coughing up blood, shortness of breath, rapid heart rate, chest pain, unexplained weight loss or night sweats.

## 2022-04-25 NOTE — ED PROVIDER NOTE - PATIENT PORTAL LINK FT
You can access the FollowMyHealth Patient Portal offered by [facility] by registering at the following website: [email or web address]. By joining [employer]’s FollowMyHealth portal, you will also be able to view your health information using other applications (apps) compatible with our system.

## 2022-04-25 NOTE — ED ADULT TRIAGE NOTE - CHIEF COMPLAINT QUOTE
pt c/o persistent cough , is being treated with outpatient doctor, pt states to cough so much that she might break a rib and now having SOB, pt shows no distress during triage, denies chest pain,

## 2022-04-25 NOTE — ED PROVIDER NOTE - CLINICAL SUMMARY MEDICAL DECISION MAKING FREE TEXT BOX
pt with right sided rib pain concerning for possible rib fractures, plan for labs CT chest, pt currently differing pain meds at this time.

## 2022-04-25 NOTE — ED PROVIDER NOTE - PROGRESS NOTE DETAILS
Gracie TO: Patient feeling better. Has outpatient follow up. No rib fractures on CT. Return precautions given.

## 2022-04-25 NOTE — ED PROVIDER NOTE - PHYSICAL EXAMINATION
Gen: Well appearing in NAD  Head: NC/AT  Neck: trachea midline  Card: regular rate and rhythm  Resp:  CTAB  Abd: soft, non-tender  Ext: no deformities  Msk: + right lower anterior chest wall tenderness to palpation   Neuro:  A&O appears non focal  Skin:  Warm and dry as visualized  Psych:  Normal affect and mood

## 2022-04-26 LAB
ALBUMIN SERPL ELPH-MCNC: 3.9 G/DL — SIGNIFICANT CHANGE UP (ref 3.3–5.2)
ALP SERPL-CCNC: 74 U/L — SIGNIFICANT CHANGE UP (ref 40–120)
ALT FLD-CCNC: 16 U/L — SIGNIFICANT CHANGE UP
ANION GAP SERPL CALC-SCNC: 13 MMOL/L — SIGNIFICANT CHANGE UP (ref 5–17)
APTT BLD: 53.7 SEC — HIGH (ref 27.5–35.5)
AST SERPL-CCNC: 31 U/L — SIGNIFICANT CHANGE UP
BASOPHILS # BLD AUTO: 0.04 K/UL — SIGNIFICANT CHANGE UP (ref 0–0.2)
BASOPHILS NFR BLD AUTO: 0.4 % — SIGNIFICANT CHANGE UP (ref 0–2)
BILIRUB SERPL-MCNC: <0.2 MG/DL — LOW (ref 0.4–2)
BLD GP AB SCN SERPL QL: SIGNIFICANT CHANGE UP
BUN SERPL-MCNC: 35.2 MG/DL — HIGH (ref 8–20)
CALCIUM SERPL-MCNC: 9.6 MG/DL — SIGNIFICANT CHANGE UP (ref 8.6–10.2)
CHLORIDE SERPL-SCNC: 100 MMOL/L — SIGNIFICANT CHANGE UP (ref 98–107)
CO2 SERPL-SCNC: 20 MMOL/L — LOW (ref 22–29)
CREAT SERPL-MCNC: 0.86 MG/DL — SIGNIFICANT CHANGE UP (ref 0.5–1.3)
EGFR: 64 ML/MIN/1.73M2 — SIGNIFICANT CHANGE UP
EOSINOPHIL # BLD AUTO: 0.14 K/UL — SIGNIFICANT CHANGE UP (ref 0–0.5)
EOSINOPHIL NFR BLD AUTO: 1.4 % — SIGNIFICANT CHANGE UP (ref 0–6)
GLUCOSE SERPL-MCNC: 102 MG/DL — HIGH (ref 70–99)
HCT VFR BLD CALC: 37 % — SIGNIFICANT CHANGE UP (ref 34.5–45)
HGB BLD-MCNC: 12.3 G/DL — SIGNIFICANT CHANGE UP (ref 11.5–15.5)
IMM GRANULOCYTES NFR BLD AUTO: 0.4 % — SIGNIFICANT CHANGE UP (ref 0–1.5)
INR BLD: 0.94 RATIO — SIGNIFICANT CHANGE UP (ref 0.88–1.16)
LYMPHOCYTES # BLD AUTO: 2.18 K/UL — SIGNIFICANT CHANGE UP (ref 1–3.3)
LYMPHOCYTES # BLD AUTO: 21.9 % — SIGNIFICANT CHANGE UP (ref 13–44)
MCHC RBC-ENTMCNC: 31.7 PG — SIGNIFICANT CHANGE UP (ref 27–34)
MCHC RBC-ENTMCNC: 33.2 GM/DL — SIGNIFICANT CHANGE UP (ref 32–36)
MCV RBC AUTO: 95.4 FL — SIGNIFICANT CHANGE UP (ref 80–100)
MONOCYTES # BLD AUTO: 1.06 K/UL — HIGH (ref 0–0.9)
MONOCYTES NFR BLD AUTO: 10.7 % — SIGNIFICANT CHANGE UP (ref 2–14)
NEUTROPHILS # BLD AUTO: 6.49 K/UL — SIGNIFICANT CHANGE UP (ref 1.8–7.4)
NEUTROPHILS NFR BLD AUTO: 65.2 % — SIGNIFICANT CHANGE UP (ref 43–77)
PLATELET # BLD AUTO: 224 K/UL — SIGNIFICANT CHANGE UP (ref 150–400)
POTASSIUM SERPL-MCNC: 4.6 MMOL/L — SIGNIFICANT CHANGE UP (ref 3.5–5.3)
POTASSIUM SERPL-SCNC: 4.6 MMOL/L — SIGNIFICANT CHANGE UP (ref 3.5–5.3)
PROT SERPL-MCNC: 7.1 G/DL — SIGNIFICANT CHANGE UP (ref 6.6–8.7)
PROTHROM AB SERPL-ACNC: 10.9 SEC — SIGNIFICANT CHANGE UP (ref 10.5–13.4)
RBC # BLD: 3.88 M/UL — SIGNIFICANT CHANGE UP (ref 3.8–5.2)
RBC # FLD: 13.7 % — SIGNIFICANT CHANGE UP (ref 10.3–14.5)
SODIUM SERPL-SCNC: 133 MMOL/L — LOW (ref 135–145)
WBC # BLD: 9.95 K/UL — SIGNIFICANT CHANGE UP (ref 3.8–10.5)
WBC # FLD AUTO: 9.95 K/UL — SIGNIFICANT CHANGE UP (ref 3.8–10.5)

## 2022-04-26 PROCEDURE — 85610 PROTHROMBIN TIME: CPT

## 2022-04-26 PROCEDURE — 86900 BLOOD TYPING SEROLOGIC ABO: CPT

## 2022-04-26 PROCEDURE — 80053 COMPREHEN METABOLIC PANEL: CPT

## 2022-04-26 PROCEDURE — 36415 COLL VENOUS BLD VENIPUNCTURE: CPT

## 2022-04-26 PROCEDURE — 71250 CT THORAX DX C-: CPT | Mod: 26,MA

## 2022-04-26 PROCEDURE — 85730 THROMBOPLASTIN TIME PARTIAL: CPT

## 2022-04-26 PROCEDURE — 71250 CT THORAX DX C-: CPT | Mod: MA

## 2022-04-26 PROCEDURE — 86850 RBC ANTIBODY SCREEN: CPT

## 2022-04-26 PROCEDURE — 99284 EMERGENCY DEPT VISIT MOD MDM: CPT | Mod: 25

## 2022-04-26 PROCEDURE — 86901 BLOOD TYPING SEROLOGIC RH(D): CPT

## 2022-04-26 PROCEDURE — 85025 COMPLETE CBC W/AUTO DIFF WBC: CPT

## 2022-04-26 RX ORDER — ACETAMINOPHEN 500 MG
975 TABLET ORAL ONCE
Refills: 0 | Status: COMPLETED | OUTPATIENT
Start: 2022-04-26 | End: 2022-04-26

## 2022-04-26 RX ADMIN — Medication 975 MILLIGRAM(S): at 03:21

## 2022-04-26 NOTE — ED ADULT NURSE NOTE - NSIMPLEMENTINTERV_GEN_ALL_ED
no Implemented All Universal Safety Interventions:  [place] to call system. Call bell, personal items and telephone within reach. Instruct patient to call for assistance. Room bathroom lighting operational. Non-slip footwear when patient is off stretcher. Physically safe environment: no spills, clutter or unnecessary equipment. Stretcher in lowest position, wheels locked, appropriate side rails in place.

## 2022-04-26 NOTE — ED ADULT NURSE NOTE - OBJECTIVE STATEMENT
patient stated she has had a cough for a few days and that she has increased SOB when coughing and also she feels like she cant take a deep breath because her ribs hurt from coughing so much

## 2022-04-27 DIAGNOSIS — Z90.49 ACQUIRED ABSENCE OF OTHER SPECIFIED PARTS OF DIGESTIVE TRACT: ICD-10-CM

## 2022-04-27 DIAGNOSIS — E78.5 HYPERLIPIDEMIA, UNSPECIFIED: ICD-10-CM

## 2022-04-27 DIAGNOSIS — Z88.2 ALLERGY STATUS TO SULFONAMIDES: ICD-10-CM

## 2022-04-27 DIAGNOSIS — I10 ESSENTIAL (PRIMARY) HYPERTENSION: ICD-10-CM

## 2022-04-27 DIAGNOSIS — R06.02 SHORTNESS OF BREATH: ICD-10-CM

## 2022-04-27 DIAGNOSIS — E03.9 HYPOTHYROIDISM, UNSPECIFIED: ICD-10-CM

## 2022-04-27 DIAGNOSIS — R07.81 PLEURODYNIA: ICD-10-CM

## 2022-04-28 ENCOUNTER — NON-APPOINTMENT (OUTPATIENT)
Age: 87
End: 2022-04-28

## 2022-04-28 ENCOUNTER — APPOINTMENT (OUTPATIENT)
Dept: CARDIOLOGY | Facility: CLINIC | Age: 87
End: 2022-04-28

## 2022-04-28 ENCOUNTER — APPOINTMENT (OUTPATIENT)
Dept: CARDIOLOGY | Facility: CLINIC | Age: 87
End: 2022-04-28
Payer: MEDICARE

## 2022-04-28 VITALS
HEIGHT: 62 IN | HEART RATE: 69 BPM | OXYGEN SATURATION: 98 % | WEIGHT: 113 LBS | BODY MASS INDEX: 20.8 KG/M2 | SYSTOLIC BLOOD PRESSURE: 139 MMHG | DIASTOLIC BLOOD PRESSURE: 70 MMHG | TEMPERATURE: 97.6 F

## 2022-04-28 DIAGNOSIS — I10 ESSENTIAL (PRIMARY) HYPERTENSION: ICD-10-CM

## 2022-04-28 DIAGNOSIS — E78.00 PURE HYPERCHOLESTEROLEMIA, UNSPECIFIED: ICD-10-CM

## 2022-04-28 DIAGNOSIS — R01.1 CARDIAC MURMUR, UNSPECIFIED: ICD-10-CM

## 2022-04-28 DIAGNOSIS — I65.23 OCCLUSION AND STENOSIS OF BILATERAL CAROTID ARTERIES: ICD-10-CM

## 2022-04-28 PROCEDURE — 93000 ELECTROCARDIOGRAM COMPLETE: CPT

## 2022-04-28 PROCEDURE — 99204 OFFICE O/P NEW MOD 45 MIN: CPT

## 2022-04-28 RX ORDER — CLARITHROMYCIN 250 MG/1
250 TABLET, FILM COATED ORAL
Qty: 20 | Refills: 0 | Status: DISCONTINUED | COMMUNITY
Start: 2021-08-06 | End: 2022-04-28

## 2022-04-28 RX ORDER — ONDANSETRON 4 MG/1
4 TABLET, ORALLY DISINTEGRATING ORAL
Qty: 9 | Refills: 0 | Status: DISCONTINUED | COMMUNITY
Start: 2021-11-16 | End: 2022-04-28

## 2022-04-28 RX ORDER — FAMOTIDINE 20 MG/1
20 TABLET, FILM COATED ORAL
Qty: 30 | Refills: 0 | Status: DISCONTINUED | COMMUNITY
Start: 2021-11-16 | End: 2022-04-28

## 2022-04-28 RX ORDER — ESCITALOPRAM OXALATE 10 MG/1
10 TABLET ORAL
Qty: 90 | Refills: 0 | Status: DISCONTINUED | COMMUNITY
Start: 2018-04-30 | End: 2022-04-28

## 2022-04-28 RX ORDER — DICLOFENAC SODIUM 16.05 MG/ML
1.5 SOLUTION TOPICAL
Qty: 1 | Refills: 0 | Status: DISCONTINUED | COMMUNITY
Start: 2020-07-11 | End: 2022-04-28

## 2022-04-30 PROBLEM — I10 HYPERTENSION, ESSENTIAL: Status: ACTIVE | Noted: 2017-11-01

## 2022-04-30 PROBLEM — I65.23 ATHEROSCLEROSIS OF BOTH CAROTID ARTERIES: Status: ACTIVE | Noted: 2017-11-01

## 2022-04-30 PROBLEM — E78.00 HYPERCHOLESTEREMIA: Status: ACTIVE | Noted: 2017-11-01

## 2022-04-30 NOTE — HISTORY OF PRESENT ILLNESS
[FreeTextEntry1] : 89 yo female here for cardiac evaluation due to risk factors\par Her history is significant for htn, HLD. She is one of the sisters at [employer]\par In 1997 had craniotomy. She was having seizures.  Fluid was building behind the cranial nerves. She had poor balance. She is now better but good success without seizures. \par \par Two years ago she had URI. She still has post nasal drip and had her lung checked and went to ED with right rib pain. She has bruised her rib.  \par Very active, denies any cp or sob with her level of activity.  \par She had a stress test, last 4-5 years ago. \par She had seen Dr Mensah for cough in the past. \par No palpitation, syncope or presyncope.\par

## 2022-04-30 NOTE — PHYSICAL EXAM
[Normal Conjunctiva] : normal conjunctiva [Normal Venous Pressure] : normal venous pressure [Normal S1, S2] : normal S1, S2 [Clear Lung Fields] : clear lung fields [Good Air Entry] : good air entry [Normal] : moves all extremities, no focal deficits, normal speech [de-identified] : b/l bruits  [de-identified] : 3/6 LOUIS venegas.

## 2022-04-30 NOTE — ASSESSMENT
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 \par She has a murmur of AI and AS. TTE is ordered.\par BP is to gaol for this 89 yo female\par Cont with BP meds\par Prior Hx of carotid plaque., repeat carotid Duplex\par Cont with ASA, no hx of bleeding \par Plan for exercise stress echocardiogram with risk factors.  \par If above unrevealing can follow with us in 1 year. \par \par

## 2022-04-30 NOTE — REVIEW OF SYSTEMS
[Weight Loss (___ Lbs)] : [unfilled] ~Ulb weight loss [Joint Pain] : joint pain [Feeling Fatigued] : not feeling fatigued [Earache] : no earache [Discharge From Ears] : no discharge from the ears [Dyspnea on exertion] : not dyspnea during exertion [Leg Claudication] : no intermittent leg claudication [Palpitations] : no palpitations [Cough] : no cough [Wheezing] : no wheezing [Abdominal Pain] : no abdominal pain [Nausea] : no nausea [Dysuria] : no dysuria [Pelvic Pain] : no pelvic pain [Myalgia] : no myalgia [Rash] : no rash [Skin Lesions] : no skin lesions [Dizziness] : no dizziness [Tremor] : no tremor was seen [Confusion] : no confusion was observed [Under Stress] : not under stress [Easy Bleeding] : no tendency for easy bleeding [FreeTextEntry3] : wears glasses

## 2022-05-10 ENCOUNTER — APPOINTMENT (OUTPATIENT)
Dept: CARDIOLOGY | Facility: CLINIC | Age: 87
End: 2022-05-10
Payer: MEDICARE

## 2022-05-10 PROCEDURE — 93880 EXTRACRANIAL BILAT STUDY: CPT

## 2022-05-10 PROCEDURE — 93306 TTE W/DOPPLER COMPLETE: CPT

## 2022-05-18 ENCOUNTER — APPOINTMENT (OUTPATIENT)
Dept: CARDIOLOGY | Facility: CLINIC | Age: 87
End: 2022-05-18

## 2022-05-18 VITALS
BODY MASS INDEX: 21.16 KG/M2 | SYSTOLIC BLOOD PRESSURE: 190 MMHG | WEIGHT: 115 LBS | DIASTOLIC BLOOD PRESSURE: 80 MMHG | HEIGHT: 62 IN | TEMPERATURE: 97.9 F

## 2022-06-15 ENCOUNTER — APPOINTMENT (OUTPATIENT)
Dept: CARDIOLOGY | Facility: CLINIC | Age: 87
End: 2022-06-15
Payer: MEDICARE

## 2022-06-15 PROCEDURE — 93351 STRESS TTE COMPLETE: CPT

## 2022-06-15 PROCEDURE — 93320 DOPPLER ECHO COMPLETE: CPT

## 2022-12-22 ENCOUNTER — APPOINTMENT (OUTPATIENT)
Dept: PULMONOLOGY | Facility: CLINIC | Age: 87
End: 2022-12-22

## 2022-12-22 VITALS
OXYGEN SATURATION: 98 % | SYSTOLIC BLOOD PRESSURE: 110 MMHG | DIASTOLIC BLOOD PRESSURE: 56 MMHG | HEIGHT: 62 IN | WEIGHT: 113 LBS | HEART RATE: 92 BPM | RESPIRATION RATE: 14 BRPM | BODY MASS INDEX: 20.8 KG/M2

## 2022-12-22 PROCEDURE — 99204 OFFICE O/P NEW MOD 45 MIN: CPT

## 2022-12-22 RX ORDER — AZELASTINE HYDROCHLORIDE 137 UG/1
0.1 SPRAY, METERED NASAL
Qty: 90 | Refills: 0 | Status: DISCONTINUED | COMMUNITY
Start: 2021-08-30 | End: 2022-12-22

## 2022-12-22 RX ORDER — FLUTICASONE PROPIONATE 50 UG/1
50 SPRAY, METERED NASAL TWICE DAILY
Qty: 1 | Refills: 6 | Status: DISCONTINUED | COMMUNITY
Start: 2021-11-29 | End: 2022-12-22

## 2022-12-22 NOTE — CONSULT LETTER
[Dear  ___] : Dear  [unfilled], [Consult Letter:] : I had the pleasure of evaluating your patient, [unfilled]. [Please see my note below.] : Please see my note below. [Consult Closing:] : Thank you very much for allowing me to participate in the care of this patient.  If you have any questions, please do not hesitate to contact me. [Sincerely,] : Sincerely, [FreeTextEntry3] : Gibran Hinojosa MD FCCP\par Pulmonary/Critical Care/Sleep Medicine\par Department of Internal Medicine\par \par [facility] [employer]\par

## 2022-12-22 NOTE — HISTORY OF PRESENT ILLNESS
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  Patient states that she is had the cough since a bad cold last year which was treated and responded to steroids.  She notes her cough worse at night and in the supine position.  She localizes it to the base of her neck with an associated postnasal drip.  She did seek the attention of an allergist who also attributed her complaints to a postnasal drip.  She is on no chronic pulmonary medications and has had no previous respiratory issues.  She has had prior pulmonary functions which were normal.  There is no history of change in home or work environment.  No rashes myalgias arthralgias or changes in weight

## 2022-12-22 NOTE — DISCUSSION/SUMMARY
[FreeTextEntry1] : 89-year-old female seen today for complaints of intermittent cough.  Symptoms consistent with postnasal drip possibly from minor chronic sinus congestion.  No respiratory distress or suspicious lesions on CAT scan.  Recommending the patient should initiate nasal saline rinse at least once a day, an expectorant such as Mucinex or honey and improved hydration.  Doubt the need for bronchodilators or ENT evaluation.  We will follow-up with spirometry in 6 to 8 weeks.

## 2022-12-29 NOTE — REVIEW OF SYSTEMS
Removed intact [Chills] : chills [Feeling Tired] : fatigue [Sleep Disturbances] : ~T sleep disturbances [Negative] : Heme/Lymph [Joint Pain] : no joint pain [FreeTextEntry9] : b/l knee

## 2023-02-22 ENCOUNTER — APPOINTMENT (OUTPATIENT)
Dept: PULMONOLOGY | Facility: CLINIC | Age: 88
End: 2023-02-22

## 2023-05-01 ENCOUNTER — APPOINTMENT (OUTPATIENT)
Dept: CARDIOLOGY | Facility: CLINIC | Age: 88
End: 2023-05-01

## 2023-06-16 NOTE — ED ADULT NURSE REASSESSMENT NOTE - SYMPTOMS
Impression: Age-related nuclear cataract, bilateral: H25.13. Plan: Cataracts account for the patient's complaints. Discussed all risks, benefits, procedures and recovery. Patient understands changing glasses will not improve vision. Patient desires to have surgery, recommend phacoemulsification with intraocular lens. Recommend CE OS then OD, Standard IOL, aim plano Discussed need for glasses for BCVA after surgery. If desired, patient is a candidate for Toric IOL (will need reading glasses).
none

## 2023-07-12 NOTE — END OF VISIT
[FreeTextEntry3] : I, Damien Keller, acted solely as a scribe for Dr. Harvey Garcia on this date 05/10/2021.  show

## 2023-09-29 ENCOUNTER — APPOINTMENT (OUTPATIENT)
Dept: PULMONOLOGY | Facility: CLINIC | Age: 88
End: 2023-09-29
Payer: MEDICARE

## 2023-09-29 VITALS
HEART RATE: 59 BPM | DIASTOLIC BLOOD PRESSURE: 62 MMHG | BODY MASS INDEX: 20.13 KG/M2 | OXYGEN SATURATION: 97 % | RESPIRATION RATE: 14 BRPM | WEIGHT: 108 LBS | SYSTOLIC BLOOD PRESSURE: 114 MMHG | HEIGHT: 61.5 IN

## 2023-09-29 DIAGNOSIS — J47.9 BRONCHIECTASIS, UNCOMPLICATED: ICD-10-CM

## 2023-09-29 DIAGNOSIS — R05.9 COUGH, UNSPECIFIED: ICD-10-CM

## 2023-09-29 PROCEDURE — 99215 OFFICE O/P EST HI 40 MIN: CPT

## 2023-10-20 NOTE — ED ADULT NURSE NOTE - PAIN RATING/NUMBER SCALE (0-10): ACTIVITY
Lucero Parrish CNP rounding at this time.  Made aware of low HR and that metoprolol has been held.  CNP to consult Dr Doan- cardiologist.  Patient resting quietly in bed.  Denies dizziness/lightheadedness.     0

## 2024-07-29 NOTE — ED ADULT NURSE NOTE - NSFALLRSKASSESSTYPE_ED_ALL_ED
[FreeTextEntry1] : Gen: NAD, alert HEENT: normocephalic, clear TM bilaterally, EOMI, inflamed nasal mucosa, erythematous oropharynx, mildly tender anterior cervical lymph nodes Cardio: RRR, S1,S2, no murmur Resp: CTAB, no wheezing Abdomen: soft, NT, ND, no increased bowel sounds, no hepatosplenomegaly Ext: moves all extremities x 4, warm, well perfused x 4, capillary refill <2s Neuro: normotonic, +2 knee jerk Skin: warm Initial (On Arrival)

## 2024-11-11 ENCOUNTER — EMERGENCY (EMERGENCY)
Facility: HOSPITAL | Age: 89
LOS: 1 days | Discharge: HOME | End: 2024-11-11
Attending: EMERGENCY MEDICINE
Payer: MEDICARE

## 2024-11-11 VITALS
OXYGEN SATURATION: 96 % | SYSTOLIC BLOOD PRESSURE: 147 MMHG | DIASTOLIC BLOOD PRESSURE: 64 MMHG | TEMPERATURE: 98 F | HEART RATE: 92 BPM | RESPIRATION RATE: 18 BRPM

## 2024-11-11 VITALS
DIASTOLIC BLOOD PRESSURE: 83 MMHG | SYSTOLIC BLOOD PRESSURE: 158 MMHG | OXYGEN SATURATION: 100 % | HEART RATE: 63 BPM | TEMPERATURE: 98 F | RESPIRATION RATE: 20 BRPM | WEIGHT: 110.01 LBS | HEIGHT: 62 IN

## 2024-11-11 DIAGNOSIS — Z90.49 ACQUIRED ABSENCE OF OTHER SPECIFIED PARTS OF DIGESTIVE TRACT: Chronic | ICD-10-CM

## 2024-11-11 DIAGNOSIS — Z98.890 OTHER SPECIFIED POSTPROCEDURAL STATES: Chronic | ICD-10-CM

## 2024-11-11 LAB
ALBUMIN SERPL ELPH-MCNC: 3.6 G/DL — SIGNIFICANT CHANGE UP (ref 3.3–5.2)
ALP SERPL-CCNC: 67 U/L — SIGNIFICANT CHANGE UP (ref 40–120)
ALT FLD-CCNC: 12 U/L — SIGNIFICANT CHANGE UP
ANION GAP SERPL CALC-SCNC: 14 MMOL/L — SIGNIFICANT CHANGE UP (ref 5–17)
APPEARANCE UR: CLEAR — SIGNIFICANT CHANGE UP
AST SERPL-CCNC: 26 U/L — SIGNIFICANT CHANGE UP
BACTERIA # UR AUTO: NEGATIVE /HPF — SIGNIFICANT CHANGE UP
BASOPHILS # BLD AUTO: 0.02 K/UL — SIGNIFICANT CHANGE UP (ref 0–0.2)
BASOPHILS NFR BLD AUTO: 0.1 % — SIGNIFICANT CHANGE UP (ref 0–2)
BILIRUB SERPL-MCNC: 1.1 MG/DL — SIGNIFICANT CHANGE UP (ref 0.4–2)
BILIRUB UR-MCNC: NEGATIVE — SIGNIFICANT CHANGE UP
BUN SERPL-MCNC: 12.2 MG/DL — SIGNIFICANT CHANGE UP (ref 8–20)
CALCIUM SERPL-MCNC: 9.6 MG/DL — SIGNIFICANT CHANGE UP (ref 8.4–10.5)
CAST: 0 /LPF — SIGNIFICANT CHANGE UP (ref 0–4)
CHLORIDE SERPL-SCNC: 97 MMOL/L — SIGNIFICANT CHANGE UP (ref 96–108)
CO2 SERPL-SCNC: 22 MMOL/L — SIGNIFICANT CHANGE UP (ref 22–29)
COLOR SPEC: YELLOW — SIGNIFICANT CHANGE UP
CREAT SERPL-MCNC: 0.68 MG/DL — SIGNIFICANT CHANGE UP (ref 0.5–1.3)
DIFF PNL FLD: ABNORMAL
EGFR: 81 ML/MIN/1.73M2 — SIGNIFICANT CHANGE UP
EOSINOPHIL # BLD AUTO: 0.07 K/UL — SIGNIFICANT CHANGE UP (ref 0–0.5)
EOSINOPHIL NFR BLD AUTO: 0.4 % — SIGNIFICANT CHANGE UP (ref 0–6)
GLUCOSE SERPL-MCNC: 94 MG/DL — SIGNIFICANT CHANGE UP (ref 70–99)
GLUCOSE UR QL: NEGATIVE MG/DL — SIGNIFICANT CHANGE UP
HCT VFR BLD CALC: 34.2 % — LOW (ref 34.5–45)
HGB BLD-MCNC: 11.9 G/DL — SIGNIFICANT CHANGE UP (ref 11.5–15.5)
IMM GRANULOCYTES NFR BLD AUTO: 0.4 % — SIGNIFICANT CHANGE UP (ref 0–0.9)
KETONES UR-MCNC: NEGATIVE MG/DL — SIGNIFICANT CHANGE UP
LEUKOCYTE ESTERASE UR-ACNC: NEGATIVE — SIGNIFICANT CHANGE UP
LIDOCAIN IGE QN: 12 U/L — LOW (ref 22–51)
LYMPHOCYTES # BLD AUTO: 1.57 K/UL — SIGNIFICANT CHANGE UP (ref 1–3.3)
LYMPHOCYTES # BLD AUTO: 9.9 % — LOW (ref 13–44)
MCHC RBC-ENTMCNC: 32 PG — SIGNIFICANT CHANGE UP (ref 27–34)
MCHC RBC-ENTMCNC: 34.8 G/DL — SIGNIFICANT CHANGE UP (ref 32–36)
MCV RBC AUTO: 91.9 FL — SIGNIFICANT CHANGE UP (ref 80–100)
MONOCYTES # BLD AUTO: 1.14 K/UL — HIGH (ref 0–0.9)
MONOCYTES NFR BLD AUTO: 7.2 % — SIGNIFICANT CHANGE UP (ref 2–14)
NEUTROPHILS # BLD AUTO: 13.02 K/UL — HIGH (ref 1.8–7.4)
NEUTROPHILS NFR BLD AUTO: 82 % — HIGH (ref 43–77)
NITRITE UR-MCNC: NEGATIVE — SIGNIFICANT CHANGE UP
PH UR: 5.5 — SIGNIFICANT CHANGE UP (ref 5–8)
PLATELET # BLD AUTO: 208 K/UL — SIGNIFICANT CHANGE UP (ref 150–400)
POTASSIUM SERPL-MCNC: 3.7 MMOL/L — SIGNIFICANT CHANGE UP (ref 3.5–5.3)
POTASSIUM SERPL-SCNC: 3.7 MMOL/L — SIGNIFICANT CHANGE UP (ref 3.5–5.3)
PROT SERPL-MCNC: 6.3 G/DL — LOW (ref 6.6–8.7)
PROT UR-MCNC: NEGATIVE MG/DL — SIGNIFICANT CHANGE UP
RBC # BLD: 3.72 M/UL — LOW (ref 3.8–5.2)
RBC # FLD: 13.6 % — SIGNIFICANT CHANGE UP (ref 10.3–14.5)
RBC CASTS # UR COMP ASSIST: 2 /HPF — SIGNIFICANT CHANGE UP (ref 0–4)
SODIUM SERPL-SCNC: 133 MMOL/L — LOW (ref 135–145)
SP GR SPEC: 1.01 — SIGNIFICANT CHANGE UP (ref 1–1.03)
SQUAMOUS # UR AUTO: 0 /HPF — SIGNIFICANT CHANGE UP (ref 0–5)
UROBILINOGEN FLD QL: 0.2 MG/DL — SIGNIFICANT CHANGE UP (ref 0.2–1)
WBC # BLD: 15.89 K/UL — HIGH (ref 3.8–10.5)
WBC # FLD AUTO: 15.89 K/UL — HIGH (ref 3.8–10.5)
WBC UR QL: 0 /HPF — SIGNIFICANT CHANGE UP (ref 0–5)

## 2024-11-11 PROCEDURE — 99285 EMERGENCY DEPT VISIT HI MDM: CPT | Mod: GC

## 2024-11-11 PROCEDURE — 74177 CT ABD & PELVIS W/CONTRAST: CPT | Mod: 26,MC

## 2024-11-11 RX ORDER — AMOXICILLIN AND CLAVULANATE POTASSIUM 600; 42.9 MG/5ML; MG/5ML
1 POWDER, FOR SUSPENSION ORAL
Qty: 14 | Refills: 0
Start: 2024-11-11 | End: 2024-11-17

## 2024-11-11 RX ORDER — AMOXICILLIN AND CLAVULANATE POTASSIUM 600; 42.9 MG/5ML; MG/5ML
1 POWDER, FOR SUSPENSION ORAL ONCE
Refills: 0 | Status: COMPLETED | OUTPATIENT
Start: 2024-11-11 | End: 2024-11-11

## 2024-11-11 RX ORDER — ONDANSETRON HYDROCHLORIDE 2 MG/ML
4 INJECTION, SOLUTION INTRAMUSCULAR; INTRAVENOUS ONCE
Refills: 0 | Status: COMPLETED | OUTPATIENT
Start: 2024-11-11 | End: 2024-11-11

## 2024-11-11 RX ADMIN — AMOXICILLIN AND CLAVULANATE POTASSIUM 1 TABLET(S): 600; 42.9 POWDER, FOR SUSPENSION ORAL at 16:48

## 2024-11-11 NOTE — ED PROVIDER NOTE - ATTENDING CONTRIBUTION TO CARE
I personally saw the patient with the resident, and completed the key components of the history and physical exam. I then discussed the management plan with the resident.    89-year-old female with past medical history hypertension, hyperlipidemia presents for cramping lower abdominal pain that started yesterday with multiple episodes of watery diarrhea and some bright red blood per rectum.  She notes nausea yesterday that improved with Pepto-Bismol, nothing since.  She denies fever, urinary symptoms, blood in urine.  Her last colonoscopy was in her 80s and was normal.  She had an open appendectomy at age 10, no other belly surgeries.    NAD, very well-appearing, RRR, no respiratory distress, abdomen soft, nontender, right lower quadrant surgical scar.    Will check labs, CT abdomen/pelvis and reassess.

## 2024-11-11 NOTE — ED PROVIDER NOTE - NSFOLLOWUPINSTRUCTIONS_ED_ALL_ED_FT
Please follow-up with your primary care doctor.  Please call for an appointment in the next 48 hours but if you cannot follow-up with your primary care doctor please return to the Emergency Department for any urgent issues.    You were given a copy of the tests performed today.  Please bring the results with you and review them with your primary care doctor.    If you have any worsening of symptoms or any other concerns please return to the Emergency Department immediately.    Please continue taking your home medications as directed.    --------  Colitis    WHAT YOU NEED TO KNOW:    Colitis is swelling and irritation of your colon. Colitis may be caused by ulcers or a problem with your immune system. Bacteria, a virus, or a parasite may also cause colitis. The cause may not be known. You may have diarrhea, abdominal pain, fever, or blood or mucus in your bowel movement.    DISCHARGE INSTRUCTIONS:    Return to the emergency department if:    You have sudden trouble breathing.    Your bowel movements are black or have blood in them.    You have blood in your vomit.    You have severe abdominal pain or your abdomen is swollen and feels hard.    You have any of the following signs of dehydration:  Dizziness or weakness    Dry mouth, cracked lips, or severe thirst    Fast heartbeat or breathing    Urinating very little or not at all  Call your doctor if:    Your symptoms get worse or do not go away.    You have a fever, chills, cough, or feel weak and achy.    You suddenly lose weight without trying.    You have questions or concerns about your condition or care.  Medicines:    Medicines may be given to decrease inflammation in your colon and treat diarrhea.    Take your medicine as directed. Contact your healthcare provider if you think your medicine is not helping or if you have side effects. Tell your provider if you are allergic to any medicine. Keep a list of the medicines, vitamins, and herbs you take. Include the amounts, and when and why you take them. Bring the list or the pill bottles to follow-up visits. Carry your medicine list with you in case of an emergency.  Manage your symptoms:    Drink liquids as directed to help prevent dehydration. Good liquids to drink include water, juice, and broth. Ask how much liquid to drink each day. You may need to drink an oral rehydration solution (ORS). An ORS contains a balance of water, salt, and sugar to replace body fluids lost during diarrhea.    Eat a variety of healthy foods. Healthy foods include fruits, vegetables, whole-grain breads, beans, low-fat dairy products, lean meats, and fish. You may need to eat several small meals throughout the day instead of large meals. Avoid spicy foods, caffeine, chocolate, and foods high in fat.    Talk to your healthcare provider before you take NSAIDs. NSAIDs can cause worsen your symptoms if ulcers are causing your colitis.    Start to exercise when you feel better. Regular exercise helps your bowels work normally. Ask about the best exercise plan for you.  Prevent the spread of germs:      Wash your hands often. Wash your hands several times each day. Wash after you use the bathroom, change a child's diaper, and before you prepare or eat food. Use soap and water every time. Rub your soapy hands together, lacing your fingers. Wash the front and back of your hands, and in between your fingers. Use the fingers of one hand to scrub under the fingernails of the other hand. Wash for at least 20 seconds. Rinse with warm, running water for several seconds. Then dry your hands with a clean towel or paper towel. Use hand  that contains alcohol if soap and water are not available. Do not touch your eyes, nose, or mouth without washing your hands first.  Handwashing      Cover a sneeze or cough. Use a tissue that covers your mouth and nose. Throw the tissue away in a trash can right away. Use the bend of your arm if a tissue is not available. Wash your hands well with soap and water or use a hand .    Clean surfaces often. Clean doorknobs, countertops, cell phones, and other surfaces that are touched often. Use a disinfecting wipe, a single-use sponge, or a cloth you can wash and reuse. Use disinfecting  if you do not have wipes. You can create a disinfecting  by mixing 1 part bleach with 10 parts water.    Ask about vaccines you may need. Vaccines help prevent disease caused by some viruses and bacteria. Get the influenza (flu) vaccine as soon as recommended each year. The flu vaccine is usually available starting in September or October. Flu viruses change, so it is important to get a flu vaccine every year. Get the pneumonia vaccine if recommended. This vaccine is usually recommended every 5 years. Your provider will tell you when to get this vaccine, if needed. Your healthcare provider can tell you if you should get other vaccines, and when to get them.  Follow up with your doctor as directed: You may need to return for a colonoscopy or other tests. Write down how often you have a bowel movements and what they look like. Bring this to your follow-up visits. Write down your questions so you remember to ask them during your visits.

## 2024-11-11 NOTE — ED ADULT NURSE NOTE - PRIMARY CARE PROVIDER
Received report from TERESA Burdick for break coverage. Pt awake, alert and oriented. Reporting rash to chest and abdomen. Denies difficulty breathing/vomiting. MD SAI Pierce called to bedside. IV Benadryl administered as per MD orders. VSS.  IV site clean, dry and intact. Report given to ASU GLEN Solano and updated on plan of care. Will continue to monitor. unkn

## 2024-11-11 NOTE — ED PROVIDER NOTE - PHYSICAL EXAMINATION
Will demonstrate 2 problem solving strategies to decrease aggressive behavior Will demonstrate 2 problem solving strategies to decrease aggressive behavior Will demonstrate 2 problem solving strategies to decrease aggressive behavior Will demonstrate 2 problem solving strategies to decrease aggressive behavior Const: Awake, alert and oriented. In no acute distress. Well appearing.  HEENT: NC/AT. Moist mucous membranes.  Eyes: No scleral icterus. EOMI.  Neck:. Soft and supple. Full ROM without pain.  Cardiac: Regular rate and rhythm. +S1/S2. No murmurs. Peripheral pulses 2+ and symmetric. No LE edema.  Resp: Speaking in full sentences. No evidence of respiratory distress. No wheezes, rales or rhonchi.  Abd: Soft, non-tender, non-distended. Normal bowel sounds in all 4 quadrants. No guarding or rebound.  Back: Spine midline and non-tender. No CVAT.  Neuro: no gross deficits, moving all extremities, normal speech  Skin: No rashes, abrasions or lacerations.    Rectal exam: No active bleeding from rectum. +Hemorrhoid stable. Will demonstrate 2 problem solving strategies to decrease aggressive behavior Will demonstrate 2 problem solving strategies to decrease aggressive behavior Will demonstrate 2 problem solving strategies to decrease aggressive behavior Will demonstrate 2 problem solving strategies to decrease aggressive behavior Will demonstrate 2 problem solving strategies to decrease aggressive behavior Will demonstrate 2 problem solving strategies to decrease aggressive behavior

## 2024-11-11 NOTE — ED ADULT TRIAGE NOTE - CHIEF COMPLAINT QUOTE
" I have cramps to my lower abdomen and noticed diarrhea with blood today" pt coming from [facility], also was nauseas yesterday but received some Pepto-Bismol with relief.

## 2024-11-11 NOTE — ED PROVIDER NOTE - OBJECTIVE STATEMENT
A 89 yo F with pmh HTN, HLD, presents for lower abdominal cramping since yesterday with episodes of diarrhea with bright red blood. Also has nausea. Reports Pt has a hemorrhoid. Denies fevers, chills, headache, chest pain, shortness of breath, cough, dysuria, or focal neurologic symptoms.

## 2024-11-11 NOTE — ED PROVIDER NOTE - PATIENT PORTAL LINK FT
You can access the FollowMyHealth Patient Portal offered by [facility] by registering at the following website: [email or web address]. By joining [employer]’s FollowMyHealth portal, you will also be able to view your health information using other applications (apps) compatible with our system.

## 2024-11-12 LAB
CULTURE RESULTS: SIGNIFICANT CHANGE UP
SPECIMEN SOURCE: SIGNIFICANT CHANGE UP

## 2024-11-14 DIAGNOSIS — E78.5 HYPERLIPIDEMIA, UNSPECIFIED: ICD-10-CM

## 2024-11-14 DIAGNOSIS — K52.9 NONINFECTIVE GASTROENTERITIS AND COLITIS, UNSPECIFIED: ICD-10-CM

## 2024-11-14 DIAGNOSIS — I10 ESSENTIAL (PRIMARY) HYPERTENSION: ICD-10-CM

## 2024-11-14 DIAGNOSIS — Z88.8 ALLERGY STATUS TO OTHER DRUGS, MEDICAMENTS AND BIOLOGICAL SUBSTANCES: ICD-10-CM

## 2024-11-14 DIAGNOSIS — R10.30 LOWER ABDOMINAL PAIN, UNSPECIFIED: ICD-10-CM

## 2024-11-20 PROCEDURE — 81001 URINALYSIS AUTO W/SCOPE: CPT

## 2024-11-20 PROCEDURE — 87086 URINE CULTURE/COLONY COUNT: CPT

## 2024-11-20 PROCEDURE — 96374 THER/PROPH/DIAG INJ IV PUSH: CPT | Mod: XU

## 2024-11-20 PROCEDURE — 99284 EMERGENCY DEPT VISIT MOD MDM: CPT | Mod: 25

## 2024-11-20 PROCEDURE — 80053 COMPREHEN METABOLIC PANEL: CPT

## 2024-11-20 PROCEDURE — 85025 COMPLETE CBC W/AUTO DIFF WBC: CPT

## 2024-11-20 PROCEDURE — 36415 COLL VENOUS BLD VENIPUNCTURE: CPT

## 2024-11-20 PROCEDURE — 83690 ASSAY OF LIPASE: CPT

## 2024-11-20 PROCEDURE — 74177 CT ABD & PELVIS W/CONTRAST: CPT | Mod: MC

## 2025-03-04 NOTE — PATIENT PROFILE ADULT. - PRO MENTAL HEALTH SX RECENT
OXYGEN ASSESSMENT SUMMARY  (MODIFIED 6 MIN WALK TEST FOR EVALUATION OF HOME OXYGEN NEEDS)   Final Results     Activity FiO2 SaO2 Pulse Rate       At Rest  Room Air 90% 72 bpm                     Standing Room Air 89% 74 bpm            Distance  Time   Walking Room Air 87% 76 bpm  Few steps      Standing 1 LPM  2 LPM 88%  92% 78 bpm       Walking 2 LPM 91% 85 bpm Walked total of 150 ft in 6 min                                                     Sitting Recovery 2 LPM 96% 76 bpm          Patient required   0  LPM to maintain O2 Sat >89% while resting. Patient required  2 LPM to maintain O2 Sat >89% while walking     150  feet.     Comments: Pt took 3 rest breaks.       none